# Patient Record
Sex: MALE | Race: ASIAN | NOT HISPANIC OR LATINO | URBAN - METROPOLITAN AREA
[De-identification: names, ages, dates, MRNs, and addresses within clinical notes are randomized per-mention and may not be internally consistent; named-entity substitution may affect disease eponyms.]

---

## 2017-01-04 ENCOUNTER — OFFICE VISIT (OUTPATIENT)
Dept: FAMILY MEDICINE | Facility: CLINIC | Age: 32
End: 2017-01-04

## 2017-01-04 VITALS
WEIGHT: 141.4 LBS | SYSTOLIC BLOOD PRESSURE: 113 MMHG | DIASTOLIC BLOOD PRESSURE: 73 MMHG | BODY MASS INDEX: 24.14 KG/M2 | TEMPERATURE: 97.8 F | HEART RATE: 67 BPM | HEIGHT: 64 IN

## 2017-01-04 DIAGNOSIS — G89.29 CHRONIC BILATERAL LOW BACK PAIN WITHOUT SCIATICA: ICD-10-CM

## 2017-01-04 DIAGNOSIS — M54.50 CHRONIC BILATERAL LOW BACK PAIN WITHOUT SCIATICA: ICD-10-CM

## 2017-01-04 DIAGNOSIS — R23.8 DRY SCALP: Primary | ICD-10-CM

## 2017-01-04 RX ORDER — IBUPROFEN 600 MG/1
600 TABLET, FILM COATED ORAL EVERY 6 HOURS PRN
Qty: 30 TABLET | Refills: 1 | Status: SHIPPED | OUTPATIENT
Start: 2017-01-04 | End: 2017-02-03

## 2017-01-04 NOTE — NURSING NOTE
name: Hermes House  Language: Kira  Agency: [facility]  Phone number: [phone]

## 2017-01-04 NOTE — PROGRESS NOTES
Preceptor attestation:  Patient seen and discussed with the resident.  Assessment and plan reviewed with resident and agreed upon.  Supervising physician: Anjum Harkins  [facility]

## 2017-01-04 NOTE — MR AVS SNAPSHOT
"              After Visit Summary   2017    Claudia Henson    MRN: [id number]           Patient Information     Date Of Birth          1985        Visit Information        Provider Department      2017 11:20 AM Jony Bobo MD [facility]        Today's Diagnoses     Dry scalp    -  1     Chronic bilateral low back pain without sciatica            Follow-ups after your visit        Who to contact     Please call your clinic at [phone] to:    Ask questions about your health    Make or cancel appointments    Discuss your medicines    Learn about your test results    Speak to your doctor   If you have compliments or concerns about an experience at your clinic, or if you wish to file a complaint, please contact [facility] Patient Relations at [phone] or email us at [email or web address]         Additional Information About Your Visit        MyChart Information     RoboteX is an electronic gateway that provides easy, online access to your medical records. With RoboteX, you can request a clinic appointment, read your test results, renew a prescription or communicate with your care team.     To sign up for [employer] visit the website at [email or web address]   You will be asked to enter the access code listed below, as well as some personal information. Please follow the directions to create your username and password.     Your access code is: [email or web address]  Expires: 2017 11:39 AM     Your access code will  in 90 days. If you need help or a new code, please contact your [facility] or call [phone] for assistance.        Care EveryWhere ID     This is your Care EveryWhere ID. This could be used by other organizations to access your [place] medical records  [id number]        Your Vitals Were     Pulse Temperature Height BMI (Body Mass Index)          67 97.8  F (36.6  C) (Oral) 5' 3.5\" (161.3 cm) 24.65 kg/m2   "       Blood Pressure from Last 3 Encounters:   01/04/17 113/73    Weight from Last 3 Encounters:   01/04/17 141 lb 6.4 oz (64.139 kg)              Today, you had the following     No orders found for display         Today's Medication Changes          These changes are accurate as of: 1/4/17 11:39 AM.  If you have any questions, ask your nurse or doctor.               Start taking these medicines.        Dose/Directions    ibuprofen 600 MG tablet   Commonly known as:  ADVIL/MOTRIN   Used for:  Chronic bilateral low back pain without sciatica   Started by:  Jony Bobo MD        Dose:  600 mg   Take 1 tablet (600 mg) by mouth every 6 hours as needed for moderate pain   Quantity:  30 tablet   Refills:  1       ketoconazole 1 % shampoo   Used for:  Dry scalp   Started by:  Jony Bobo MD        Please use this shampoo twice a week.   Quantity:  1 Bottle   Refills:  1            Where to get your medicines      These medications were sent to [facility] - [place], [place] - [address], [place] [address]     Phone:  [phone]    - ibuprofen 600 MG tablet  - ketoconazole 1 % shampoo             Primary Care Provider Office Phone # Fax #    Sharad Siddiqi [phone]       [address] [address]        Thank you!     Thank you for choosing [facility]  for your care. Our goal is always to provide you with excellent care. Hearing back from our patients is one way we can continue to improve our services. Please take a few minutes to complete the written survey that you may receive in the mail after your visit with us. Thank you!             Your Updated Medication List - Protect others around you: Learn how to safely use, store and throw away your medicines at www.disposemymeds.org.          This list is accurate as of: 1/4/17 11:39 AM.  Always use your most recent med list.                   Brand Name Dispense  Instructions for use    ibuprofen 600 MG tablet    ADVIL/MOTRIN    30 tablet    Take 1 tablet (600 mg) by mouth every 6 hours as needed for moderate pain       ketoconazole 1 % shampoo     1 Bottle    Please use this shampoo twice a week.

## 2017-01-05 NOTE — PROGRESS NOTES
"S: Claudia Henson is a 31 year old otherwise healthy male who presents to clinic for evaluation of scalp rash and low back pain.     He states that he has had this scalp rash on and off for the last year. He says it lasts for a week at a time and resolves on its own. He says at times he has draining lesions on his scalp. The rash is pruritic. He does not currently have the rash at this time. He does not having dandruff. He does not have any household members with similar symptoms. He has not tried any new hair care products recently. He washes his hair with shampoo every day. He says \"If I don't wash my hair every day my scalp gets very oily.\"     With regards to his back pain, he has been noting intermittent low back pain with heavy lifting for the past year. He says the pain is in the muscles next to his spine. He takes 1 tylenol for the pain when it occurs and it helps slightly. He lifts 40-50lbs at work at times. He does not note any radiation of pain, or any weakness or sensation loss in bilateral lower extremities.    ROS: Denies chest pain, sob, abdominal pain, or rashes in any other areas..     PMH: No known past medical history  MEDS: Not currently taking any other medications besides tylenol.  Allergies: NKDA    O:  /73 mmHg  Pulse 67  Temp(Src) 97.8  F (36.6  C) (Oral)  Ht 5' 3.5\" (161.3 cm)  Wt 141 lb 6.4 oz (64.139 kg)  BMI 24.65 kg/m2  General: Patient is pleasant to speak to, NAD, answers questions appropriately  Head: Areas of dried skin noted on scalp, no erythematous rashes, no areas of baldness. No draining lesions noted. Dandruff noted in hair. Atraumatic, normocephalic.  CV: S1, S2, RRR. No murmurs, clicks, or rubs  RESP: Clear to auscultation bilaterally  BACK: No scoliosis noted. No spinal step offs. No spinal tenderness to palpation. No paraspinal tenderness to palpation.     A/P: Claudia Henson is a 31 year old otherwise healthy male who presents to clinic for evaluation of scalp rash and low " back pain.    (R23.8) Dry scalp  (primary encounter diagnosis)  Comment: Patient's rash is likely secondary to dry scalp. Rash is intermittent and not causing any hair loss. It is not currently present, so it is hard to evaluate. Unlikely allergic dermatitis.   Plan:   -ketoconazole 1 % shampoo twice a week  -reduce shampooing hair to once every other day  - Patient should return to clinic if frequency of rash does not improve. He should come in when he has an active rash so it can be evaluated.             (M54.5,  G89.29) Chronic bilateral low back pain without sciatica  Comment: Low back pain likely secondary to overuse and low back strain. No signs of radicular pain. No concern for fracture at this time. Recommended PT, but patient does not wish to have PT at this time.   Plan:    -ibuprofen (ADVIL/MOTRIN) 600 MG tablet q6h PRN for pain      Jony Bobo MD  PGY1 [facility]

## 2017-02-03 DIAGNOSIS — M54.50 CHRONIC BILATERAL LOW BACK PAIN WITHOUT SCIATICA: Primary | ICD-10-CM

## 2017-02-03 DIAGNOSIS — G89.29 CHRONIC BILATERAL LOW BACK PAIN WITHOUT SCIATICA: Primary | ICD-10-CM

## 2017-02-05 RX ORDER — IBUPROFEN 600 MG/1
600 TABLET, FILM COATED ORAL EVERY 6 HOURS PRN
Qty: 30 TABLET | Refills: 1 | Status: SHIPPED
Start: 2017-02-05 | End: 2018-05-04

## 2017-03-08 DIAGNOSIS — R23.8 DRY SCALP: ICD-10-CM

## 2018-05-04 ENCOUNTER — OFFICE VISIT (OUTPATIENT)
Dept: FAMILY MEDICINE | Facility: CLINIC | Age: 33
End: 2018-05-04
Payer: COMMERCIAL

## 2018-05-04 VITALS
HEIGHT: 64 IN | SYSTOLIC BLOOD PRESSURE: 121 MMHG | OXYGEN SATURATION: 100 % | HEART RATE: 61 BPM | BODY MASS INDEX: 24.96 KG/M2 | WEIGHT: 146.2 LBS | TEMPERATURE: 98.1 F | DIASTOLIC BLOOD PRESSURE: 82 MMHG

## 2018-05-04 DIAGNOSIS — R23.8 DRY SCALP: ICD-10-CM

## 2018-05-04 DIAGNOSIS — L98.8 EROSIVE PUSTULAR DERMATOSIS OF SCALP: Primary | ICD-10-CM

## 2018-05-04 RX ORDER — TERBINAFINE HYDROCHLORIDE 250 MG/1
250 TABLET ORAL DAILY
Qty: 42 TABLET | Refills: 0 | Status: SHIPPED | OUTPATIENT
Start: 2018-05-04 | End: 2021-04-07

## 2018-05-04 NOTE — MR AVS SNAPSHOT
"              After Visit Summary   2018    Claudia Henson    MRN: [id number]           Patient Information     Date Of Birth          1985        Visit Information        Provider Department      2018 9:20 AM Anjum Harkins MD [facility]        Today's Diagnoses     Erosive pustular dermatosis of scalp    -  1    Dry scalp          Care Instructions    Ongoing scalp irritation. Redness and sensativity.  Some itch. Scattered White pustules form. Associated with hair \"coming out\" in these scattered areas.  Nizoral shampoo didn't help much    Could be fungal.  1) Nizoral shampoo twice a week  2) Rodrigue Baby shampoo on other days.   Limit washing/rubbing.  Pat dry.  No other creams  3) Terbinafine tablets    May need \"biopsy\" if not improved.    Recheck in 5 weeks          Follow-ups after your visit        Who to contact     Please call your clinic at [phone] to:    Ask questions about your health    Make or cancel appointments    Discuss your medicines    Learn about your test results    Speak to your doctor            Additional Information About Your Visit        MyChart Information     DirectLaw is an electronic gateway that provides easy, online access to your medical records. With DirectLaw, you can request a clinic appointment, read your test results, renew a prescription or communicate with your care team.     To sign up for DirectLaw visit the website at [email or web address]   You will be asked to enter the access code listed below, as well as some personal information. Please follow the directions to create your username and password.     Your access code is: [email or web address]  Expires: 2018 10:07 AM     Your access code will  in 90 days. If you need help or a new code, please contact your [facility] or call [phone] for assistance.        Care EveryWhere ID     This is your Care EveryWhere ID. This could be used by other organizations to access your " "[place] medical records  [id number]        Your Vitals Were     Pulse Temperature Height Pulse Oximetry BMI (Body Mass Index)       61 98.1  F (36.7  C) (Oral) 5' 3.5\" (161.3 cm) 100% 25.49 kg/m2        Blood Pressure from Last 3 Encounters:   05/04/18 121/82   01/04/17 113/73    Weight from Last 3 Encounters:   05/04/18 146 lb 3.2 oz (66.3 kg)   01/04/17 141 lb 6.4 oz (64.1 kg)              Today, you had the following     No orders found for display         Today's Medication Changes          These changes are accurate as of 5/4/18 10:07 AM.  If you have any questions, ask your nurse or doctor.               Start taking these medicines.        Dose/Directions    terbinafine 250 MG tablet   Commonly known as:  lamISIL   Used for:  Erosive pustular dermatosis of scalp   Started by:  Anjum Harkins MD        Dose:  250 mg   Take 1 tablet (250 mg) by mouth daily   Quantity:  42 tablet   Refills:  0         Stop taking these medicines if you haven't already. Please contact your care team if you have questions.     ibuprofen 600 MG tablet   Commonly known as:  ADVIL/MOTRIN   Stopped by:  Anjum Harkins MD                Where to get your medicines      These medications were sent to [facility] - [place], [place] - [address], [place] [address]     Phone:  [phone]     ketoconazole 1 % shampoo    terbinafine 250 MG tablet                Primary Care Provider Office Phone # Fax #    Jony Fredrick Bobo [phone]       [facility] [address] [place] [address]        Equal Access to Services     [facility] CampusRAGINI AH: Hadii emili feldman hadasho Sopito, waaxda luqadaha, qaybta kaalmada adeegyada, ashlee whitfield. So [facility] [phone].    ATENCIÓN: Si habla español, tiene a iraheta disposición servicios gratuitos de asistencia lingüística. Maria T malone [phone].    We comply with applicable federal civil rights laws and [place] laws. We " do not discriminate on the basis of race, color, national origin, age, disability, sex, sexual orientation, or gender identity.            Thank you!     Thank you for choosing [facility]  for your care. Our goal is always to provide you with excellent care. Hearing back from our patients is one way we can continue to improve our services. Please take a few minutes to complete the written survey that you may receive in the mail after your visit with us. Thank you!             Your Updated Medication List - Protect others around you: Learn how to safely use, store and throw away your medicines at www.disposemymeds.org.          This list is accurate as of 5/4/18 10:07 AM.  Always use your most recent med list.                   Brand Name Dispense Instructions for use Diagnosis    ketoconazole 1 % shampoo     1 Bottle    Please use this shampoo twice a week.    Dry scalp       terbinafine 250 MG tablet    lamISIL    42 tablet    Take 1 tablet (250 mg) by mouth daily    Erosive pustular dermatosis of scalp

## 2018-05-04 NOTE — NURSING NOTE
Due to patient being non-English speaking/uses sign language, an  was used for this visit. Only for face-to-face interpretation by an external agency, date and length of interpretation can be found on the scanned worksheet.     name: Dominick Worthy  Agency: [employer]  Language: Kira   Telephone number: [phone]  Type of interpretation: Face-to-face, spoken

## 2018-05-04 NOTE — PROGRESS NOTES
"       SUBJECTIVE       Claudia Henson is a 33 year old  male with a PMH significant for:   There is no problem list on file for this patient.    He presents with Ongoing scalp irritation. Redness and sensativity.  Some itch. Scattered White pustules form. Associated with hair \"coming out\" in these scattered areas.  Nizoral shampoo didn't help much.    PMH, Medications and Allergies were reviewed and updated as needed.        REVIEW OF SYSTEMS     General: No fevers, chills, sweats, unexplained weight loss  Head: No headache  Neck: No swallowing problems   CV: No chest pain or palpitations  Resp: No shortness of breath.  No cough. No hemoptysis.  GI: No constipation, diarrhea, or blood in stool.  no nausea or vomiting  : No pain passing urine or urinary frequency            OBJECTIVE     Vitals:    05/04/18 0925   BP: 121/82   Pulse: 61   Temp: 98.1  F (36.7  C)   TempSrc: Oral   SpO2: 100%   Weight: 146 lb 3.2 oz (66.3 kg)   Height: 5' 3.5\" (161.3 cm)     Body mass index is 25.49 kg/(m^2).    Gen:  Well nourished and in NAD  HEENT: PERRLA; TMs normal color and landmarks; nasopharynx pink and moist; oropharynx pink and moist  Neck: supple without lymphadenopathy  CV:  RRR  - no murmurs, rubs, or gallups,   Pulm:  CTAB, no wheezes/rales/rhonchi, good air entry   ABD: soft, nontender, no masses, no rebound, BS intact throughout  Scalp: Diffuse scattered erythematous papules. Some scale. No crust. No ring pattern. 8-10 one mm white pustules scattered. Associated hair \"thinning\". No broke shafts visiable     No results found for this or any previous visit (from the past 24 hour(s)).        ASSESSMENT AND PLAN     Claudia was seen today for other and medication reconciliation.    Diagnoses and all orders for this visit:    Erosive pustular dermatosis of scalp  -     terbinafine (LAMISIL) 250 MG tablet; Take 1 tablet (250 mg) by mouth daily    Dry scalp  -     ketoconazole 1 % shampoo; Please use this shampoo twice a " "week.        Patient Instructions   Ongoing scalp irritation. Redness and sensativity.  Some itch. Scattered White pustules form. Associated with hair \"coming out\" in these scattered areas.  Nizoral shampoo didn't help much    Could be fungal.  1) Nizoral shampoo twice a week  2) Rodrigue Baby shampoo on other days.   Limit washing/rubbing.  Pat dry.  No other creams  3) Terbinafine tablets    May need \"biopsy\" if not improved.    Recheck in 5 weeks      Total of 30 minutes was spent in face to face contact with patient with > 50% in counseling and coordination of care.  Options for treatment and/or follow-up care were reviewed with the patient. Claudia Sixto was engaged and actively involved in the decision making process. He verbalized understanding of the options discussed and was satisfied with the final plan.        Anjum Harkins MD        "

## 2018-05-04 NOTE — PATIENT INSTRUCTIONS
"Ongoing scalp irritation. Redness and sensativity.  Some itch. Scattered White pustules form. Associated with hair \"coming out\" in these scattered areas.  Nizoral shampoo didn't help much    Could be fungal.  1) Nizoral shampoo twice a week  2) Rodrigue Baby shampoo on other days.   Limit washing/rubbing.  Pat dry.  No other creams  3) Terbinafine tablets    May need \"biopsy\" if not improved.    Recheck in 5 weeks  "

## 2018-06-11 DIAGNOSIS — R23.8 DRY SCALP: ICD-10-CM

## 2019-06-03 ENCOUNTER — OFFICE VISIT (OUTPATIENT)
Dept: FAMILY MEDICINE | Facility: CLINIC | Age: 34
End: 2019-06-03
Payer: COMMERCIAL

## 2019-06-03 VITALS
WEIGHT: 144 LBS | HEART RATE: 83 BPM | SYSTOLIC BLOOD PRESSURE: 110 MMHG | TEMPERATURE: 98.4 F | OXYGEN SATURATION: 99 % | BODY MASS INDEX: 25.52 KG/M2 | HEIGHT: 63 IN | DIASTOLIC BLOOD PRESSURE: 76 MMHG | RESPIRATION RATE: 16 BRPM

## 2019-06-03 DIAGNOSIS — G89.29 CHRONIC BILATERAL LOW BACK PAIN WITHOUT SCIATICA: Primary | ICD-10-CM

## 2019-06-03 DIAGNOSIS — L30.9 ECZEMA, UNSPECIFIED TYPE: ICD-10-CM

## 2019-06-03 DIAGNOSIS — M54.50 CHRONIC BILATERAL LOW BACK PAIN WITHOUT SCIATICA: Primary | ICD-10-CM

## 2019-06-03 RX ORDER — ACETAMINOPHEN 500 MG
500-1000 TABLET ORAL EVERY 8 HOURS PRN
Qty: 60 TABLET | COMMUNITY
Start: 2019-06-03 | End: 2021-04-07

## 2019-06-03 RX ORDER — NAPROXEN 500 MG/1
500 TABLET ORAL 2 TIMES DAILY WITH MEALS
Qty: 60 TABLET | Refills: 3 | Status: SHIPPED | OUTPATIENT
Start: 2019-06-03 | End: 2021-04-07

## 2019-06-03 RX ORDER — TRIAMCINOLONE ACETONIDE 5 MG/G
CREAM TOPICAL 2 TIMES DAILY
Qty: 15 G | Refills: 3 | Status: SHIPPED | OUTPATIENT
Start: 2019-06-03 | End: 2020-01-09

## 2019-06-03 ASSESSMENT — MIFFLIN-ST. JEOR: SCORE: 1492.27

## 2019-06-03 NOTE — NURSING NOTE
Due to patient being non-English speaking/uses sign language, an  was used for this visit. Only for face-to-face interpretation by an external agency, date and length of interpretation can be found on the scanned worksheet.       name:  Dominick Worthy  Agency: [employer]  Language: Kira  Telephone number:   [phone]  Type of interpretation:  Face-to-face, Spoken

## 2019-06-03 NOTE — PROGRESS NOTES
"       JAMAL Henson is a 34 year old  male with a PMH significant for:   There is no problem list on file for this patient.    He is here today for back pain and a foot rash.     His back pain is intermittent, once every 0.5-3 weeks, for the last 6 years. He does not recognize any triggers. No relieving factors. When it hurts it is burning in quality located in the lower lumbar region. It is usually bilateral but sometimes unilateral. No history of kidney stones or infections. No numbness or tingling. No radiation of symptoms. No urinary or stool incontinence. He reports tight muscles that make it harder for him to bend forward. He has not been taking the tylenol or ibuprofen that was previously prescribed. He was previously recommended PT, however he declined at that time (2017).    Patient also reports a rash on the front of his right foot. He reports that he has been diagnosed with eczema in the past, he has used over-the-counter hydrocortisone cream in the past which has resolved the rash, but it has returned. This time hydrocortisone cream has not worked.     PMH, Medications and Allergies were reviewed and updated as needed.    ROS: no fever, chills, changes in vision, changes in bowel movements, changes in sensation, weakness        OBJECTIVE     Vitals:    06/03/19 1620   BP: 110/76   BP Location: Left arm   Patient Position: Sitting   Cuff Size: Adult Regular   Pulse: 83   Resp: 16   Temp: 98.4  F (36.9  C)   TempSrc: Oral   SpO2: 99%   Weight: 65.3 kg (144 lb)   Height: 1.607 m (5' 3.25\")     Body mass index is 25.31 kg/m .    General :  healthy and alert, no distress  HEENT:  PERRLA  Cardiovascular: regular rate and rhythm, normal S1/S2 no other heart sounds  Respiratory:  CTA, normal respiratory effort  Musculoskeletal: No back pain to palpation, no back pain with flexion of the back. Negative straight leg test  Skin:   Right foot with dry scaling plaque and thickened skin, evidence of " bleeding.   Neurological:  normal gait, sensation intact, LE strength intact    No results found for this or any previous visit (from the past 24 hour(s)).    ASSESSMENT AND PLAN       Claudia was seen today for back pain, other and medication reconciliation.    Diagnoses and all orders for this visit:    Chronic bilateral low back pain without sciatica: Intermittent, no red flag signs, likely musculoskeletal in origin.  Does not limit her function.  Patient reports that he is mostly worried that it is kidney pain, no personal or family history of kidney problems.  Patient reports that he has not been taking naproxen acetaminophen, will prescribe.  Patient was offered PT, however declined.  -     naproxen (NAPROSYN) 500 MG tablet; Take 1 tablet (500 mg) by mouth 2 times daily (with meals)  -     acetaminophen (TYLENOL) 500 MG tablet; Take 1-2 tablets (500-1,000 mg) by mouth every 8 hours as needed for mild pain    Eczema, unspecified type; chronic eczema, will treat with triamcinolone and hydration.  Recommended triamcinolone twice daily, and hydration 3 times daily.  Follow-up in 6 weeks.  -     triamcinolone (ARISTOCORT HP) 0.5 % external cream; Apply topically 2 times daily    RTC in 6 for follow up of eczema or sooner if develops new or worsening symptoms.    Discussed with MD Jony Simpson PGY 1  [facility]

## 2019-06-04 NOTE — PROGRESS NOTES
Preceptor Attestation:   Patient seen, evaluated and discussed with the resident. I have verified the content of the note, which accurately reflects my assessment of the patient and the plan of care.   Supervising Physician:  Jose L Espinoza MD

## 2020-01-09 ENCOUNTER — OFFICE VISIT (OUTPATIENT)
Dept: FAMILY MEDICINE | Facility: CLINIC | Age: 35
End: 2020-01-09
Payer: COMMERCIAL

## 2020-01-09 VITALS
HEIGHT: 64 IN | TEMPERATURE: 97.8 F | OXYGEN SATURATION: 98 % | RESPIRATION RATE: 18 BRPM | WEIGHT: 145 LBS | HEART RATE: 69 BPM | BODY MASS INDEX: 24.75 KG/M2 | SYSTOLIC BLOOD PRESSURE: 113 MMHG | DIASTOLIC BLOOD PRESSURE: 74 MMHG

## 2020-01-09 DIAGNOSIS — R23.8 DRY SCALP: ICD-10-CM

## 2020-01-09 DIAGNOSIS — L30.9 ECZEMA, UNSPECIFIED TYPE: ICD-10-CM

## 2020-01-09 RX ORDER — TRIAMCINOLONE ACETONIDE 5 MG/G
CREAM TOPICAL 2 TIMES DAILY
Qty: 15 G | Refills: 3 | Status: SHIPPED | OUTPATIENT
Start: 2020-01-09 | End: 2021-04-07

## 2020-01-09 ASSESSMENT — MIFFLIN-ST. JEOR: SCORE: 1500.78

## 2020-01-09 NOTE — PATIENT INSTRUCTIONS
Please follow up with a dentist with regards to the health of your teeth and their impact on your saliva production.     Use the steroid lotion for your rash twice daily    Use Keoconazole shampoo once daily for dry hair. Follow up as needed for possible treatment of hair loss.

## 2020-01-09 NOTE — PROGRESS NOTES
"       SUBJECTIVE       Claudia Henson is a 34 year old  male with a PMH significant for:   There is no problem list on file for this patient.    He presents with a rash on right ankle which has been present for over a year. He was treated with a cream in the past which helped partially. When he first noticed the rash it was an itchy plaque. He would scratch it and it started to bleed. He has never had another rash like this before and he has no other worrisome rashes or lesions. No allergies or asthma. No new laundry detergents, lotions, soaps.     The second issue for the pt is dandruff which is itchy. He feels his hair is growing in slower and thinner and worries this is due to the dandruff. He also notes that his hair when it is longer is oily and smells bad.  He washes every other day due to this issue. He does not use a consistent shampoo and rather just uses whatever is available. In the past he was prescribed ketoconazole which helped somewhat but he has not used for over a year.     His third issue is saliva overproduction at night which is causing him to drool on his pillow excessively. He notes that his saliva production causes him to choke if he lies on his back. His saliva is \"like spit\" sometimes composed of mucous. No heart burn. No coughing. No excess phlegm in the morning.    Pt smokes 4 cigarettes per day and has smoked since he was 15. He drinks on rare occasion, maybe twice a year. No beedle nut and no recreational drug use.     PMH, Medications and Allergies were reviewed and updated as needed.        REVIEW OF SYSTEMS     12 point review of systems negative except per HPI        OBJECTIVE     Vitals:    01/09/20 0937   BP: 113/74   BP Location: Right arm   Patient Position: Sitting   Cuff Size: Adult Regular   Pulse: 69   Resp: 18   Temp: 97.8  F (36.6  C)   TempSrc: Oral   SpO2: 98%   Weight: 65.8 kg (145 lb)   Height: 1.613 m (5' 3.5\")     Body mass index is 25.28 kg/m .  General: awake, alert, in " no apparent distress  Skin: warm dry  Head: atraumatic, hair thinning with slight scaling noted on scalp  Heent: Poor dentition noted, no erythematous or bleeding gums. Nares pink and moist   R Ankle lesion: keloid appearance with secondary excoriations, 3cm x 3cm in largest diameter, well demarcated borders without melanocytic appearance, scaling, or other concerning discoloration. Dermatitis appearance  Cardiovascular: RRR, no murmurs  Respiratory: CTA  Neuro: grossly normal  Psych: appropriate mood and affect    No results found for this or any previous visit (from the past 24 hour(s)).        ASSESSMENT AND PLAN     There are no diagnoses linked to this encounter.  Claudia was seen today for derm problem, other and hair/scalp problem.    Diagnoses and all orders for this visit:    Dry scalp  -     ketoconazole 1 % shampoo; Please use this shampoo twice a week.    Eczema, unspecified type  -     triamcinolone (ARISTOCORT HP) 0.5 % external cream; Apply topically 2 times daily    Excess saliva  Poor dentition         -     Dental referral     Consider starting minoxidil therapy at future visit.    RTC in 4 weeks for follow up of rash and hair loss or sooner if develops new or worsening symptoms.    Catracho Galarza MD

## 2020-01-09 NOTE — NURSING NOTE
Due to patient being non-English speaking/uses sign language, an  was used for this visit. Only for face-to-face interpretation by an external agency, date and length of interpretation can be found on the scanned worksheet.       name: Hermes House  Language: Kira  Agency:  [employer]  Telephone number: [phone]  Type of interpretation:  Face-to-face, spoken

## 2020-02-05 NOTE — PROGRESS NOTES
Preceptor Attestation:   Patient seen, evaluated and discussed with the resident. I have verified the content of the note, which accurately reflects my assessment of the patient and the plan of care.   Supervising Physician:  Jose L Espinoza MD.

## 2020-04-17 ENCOUNTER — TELEPHONE (OUTPATIENT)
Dept: FAMILY MEDICINE | Facility: CLINIC | Age: 35
End: 2020-04-17

## 2020-04-17 NOTE — LETTER
April 17, 2020      Claudia Sixto  [address]  [place] [address]        Dear Claudia,      We tried reaching you by phone but were unable to connect with you. We are reaching out to see how you are doing. This is a very stressful time in the world, which can cause an increase in personal stress and anxiety.     Our clinic is open.  We are here for you and are ready to meet all of your healthcare needs.  We have delayed preventive care until July.  We want everyone who can to stay home during this time for their health and the health of all.  We are now having most visits over the phone, but will see people in person if your doctor agrees that it is necessary.  We also will have video visits starting on April 6, 2020.      Call us with any questions or concerns you may have, and know that we are all in this together.       Sincerely,     Your team at [facility]  [phone]

## 2021-04-07 ENCOUNTER — OFFICE VISIT (OUTPATIENT)
Dept: FAMILY MEDICINE | Facility: CLINIC | Age: 36
End: 2021-04-07
Payer: COMMERCIAL

## 2021-04-07 VITALS
TEMPERATURE: 99.3 F | HEART RATE: 77 BPM | RESPIRATION RATE: 18 BRPM | OXYGEN SATURATION: 98 % | BODY MASS INDEX: 26.05 KG/M2 | DIASTOLIC BLOOD PRESSURE: 76 MMHG | WEIGHT: 147 LBS | HEIGHT: 63 IN | SYSTOLIC BLOOD PRESSURE: 114 MMHG

## 2021-04-07 DIAGNOSIS — L30.8 OTHER ECZEMA: ICD-10-CM

## 2021-04-07 DIAGNOSIS — R11.0 NAUSEA: ICD-10-CM

## 2021-04-07 DIAGNOSIS — R14.0 BLOATING SYMPTOM: ICD-10-CM

## 2021-04-07 DIAGNOSIS — K29.00 ACUTE GASTRITIS WITHOUT HEMORRHAGE, UNSPECIFIED GASTRITIS TYPE: Primary | ICD-10-CM

## 2021-04-07 PROCEDURE — 99213 OFFICE O/P EST LOW 20 MIN: CPT | Mod: GC | Performed by: STUDENT IN AN ORGANIZED HEALTH CARE EDUCATION/TRAINING PROGRAM

## 2021-04-07 RX ORDER — FAMOTIDINE 20 MG/1
20 TABLET, FILM COATED ORAL 2 TIMES DAILY
Qty: 30 TABLET | Refills: 3 | Status: SHIPPED | OUTPATIENT
Start: 2021-04-07 | End: 2021-04-16

## 2021-04-07 RX ORDER — TRIAMCINOLONE ACETONIDE 1 MG/G
OINTMENT TOPICAL 2 TIMES DAILY
Qty: 30 G | Refills: 1 | Status: SHIPPED | OUTPATIENT
Start: 2021-04-07 | End: 2023-02-01

## 2021-04-07 ASSESSMENT — MIFFLIN-ST. JEOR: SCORE: 1487.95

## 2021-04-07 NOTE — NURSING NOTE
name: Tiana Bingham  Language: Kira  Agency: [employer]  Phone number: [phone]    Due to patient being non-English speaking/uses sign language, an  was used for this visit. Only for face-to-face interpretation by an external agency, date and length of interpretation can be found on the scanned worksheet.     name: Tiana Bingham  Agency: [employer]  Language: Kira   Telephone number: [phone]  Type of interpretation: Telephone, spoken

## 2021-04-07 NOTE — PATIENT INSTRUCTIONS
- apply triamcinolone cream to your ankle rashes twice a day for two weeks  - start taking famotidine (Pepcid) once daily for your stomach symptoms  - please provide a stool sample and return to clinic to test for H. Pylori  - please follow up in 3 weeks

## 2021-04-07 NOTE — PROGRESS NOTES
Assessment & Plan     Acute gastritis without hemorrhage, unspecified gastritis type  Bloating symptom  Nausea  Patient reporting increased gas and bloating in the past week, new onset nausea, but no abdominal pain and benign abdominal exam. Symptoms concerning for gastritis vs IBS. Patient does smoke which increases risk of gastritis. Will test for H. Pylori today and trial antacid.   - H. Pylori Agn Fecal ([employer]); Future  - famotidine (PEPCID) 20 MG tablet; Take 1 tablet (20 mg) by mouth 2 times daily    Other eczema  Mild plaque psoriasis vs eczema. Will try stronger topical steroid BID for 2 weeks. Advised patient to moisturize well with aquaphor or similar between steroid applications.   - triamcinolone (KENALOG) 0.1 % external ointment; Apply topically 2 times daily    30 minutes spent on the date of the encounter doing chart review, history and exam, documentation and further activities per the note     Tobacco Cessation:   reports that he has been smoking. He has never used smokeless tobacco.  Tobacco Cessation Action Plan: Information offered: Patient not interested at this time    Return in about 3 weeks (around 4/28/2021) for Follow up.    Benita Behm, MD PGY1  [facility]idency  04/07/21    I precepted today with Dr. Espinoza.    Sindhu Taylor is a 36 year old who presents for the following health issues gas, bloating, rash.     HPI   Patient reports bloating in stomach for several years, but this past Friday (5 days ago) he started to feel tired and short of breath after eating due to abdominal bloating. He also reports new onset nausea after eating that feels better after vomiting. This happened twice over the weekend. He denies heartburn and abdominal pain. He reports he's been feeling more gassy, taking gasX which has helped some. No diarrhea or constipation, no hematemasis, fever or chills. He does not notice a different in symptoms if he has a meal that is higher in fat.  "Dairy makes him feel more bloated. Noone in his family is sick. He says he smokes cigarettes after every meal.     Patient also asks if he can get a different medication to treat the rashes he has had on his anterior ankles for the past 3 years. He has treated with triamcinolone 0.5% cream without any improvement in dryness or pruritis.     Review of Systems   Constitutional, HEENT, cardiovascular, pulmonary, GI, , musculoskeletal, neuro, skin, endocrine and psych systems are negative, except as otherwise noted.      Objective    /76 (BP Location: Left arm, Patient Position: Sitting, Cuff Size: Adult Regular)   Pulse 77   Temp 99.3  F (37.4  C) (Tympanic)   Resp 18   Ht 1.594 m (5' 2.75\")   Wt 66.7 kg (147 lb)   SpO2 98%   BMI 26.25 kg/m    Body mass index is 26.25 kg/m .  Physical Exam   Constitutional: Awake, alert, cooperative, no apparent distress, and appears stated age.  Eyes: Lids and lashes normal, pupils equal, round and reactive to light, extra ocular muscles intact, sclera clear, conjunctiva normal.  ENT: Normocephalic, without obvious abnormality, atraumatic, external ears without lesions,   Neck: Supple, symmetrical, trachea midline, skin normal.  Lungs: No increased work of breathing, good air exchange, clear to auscultation bilaterally, no crackles or wheezing.  Cardiovascular: Regular rate and rhythm, normal S1 and S2, no S3 or S4, and no murmur noted.  Abdomen: No scars, normal bowel sounds, soft, non-distended, non-tender, no masses palpated, no hepatosplenomegally.  Musculoskeletal: Full range of motion noted. Tone is normal.  Neurologic: Awake, alert, oriented to name, place and time.  Cranial nerves II-XII are grossly intact.  Motor is 5 out of 5 bilaterally. Sensory is intact. Gait is normal.  Neuropsychiatric: Normal affect, mood, orientation, memory and insight.  Skin: patches of dry skin with plagues and excoriations on both anterior ankles. erythema, pallor, petechia or " purpura.    ----- Service Performed and Documented by Resident or Fellow ------

## 2021-04-08 DIAGNOSIS — K29.00 ACUTE GASTRITIS WITHOUT HEMORRHAGE, UNSPECIFIED GASTRITIS TYPE: ICD-10-CM

## 2021-04-09 LAB — H PYLORI AG STL QL IA: POSITIVE

## 2021-04-09 NOTE — PROGRESS NOTES
Preceptor Attestation:    I discussed the patient with the resident and evaluated the patient in person. I have verified the content of the note, which accurately reflects my assessment of the patient and the plan of care.   Supervising Physician:  Jose L Espinoza MD.

## 2021-04-16 ENCOUNTER — OFFICE VISIT (OUTPATIENT)
Dept: FAMILY MEDICINE | Facility: CLINIC | Age: 36
End: 2021-04-16
Payer: COMMERCIAL

## 2021-04-16 VITALS
HEART RATE: 77 BPM | SYSTOLIC BLOOD PRESSURE: 112 MMHG | BODY MASS INDEX: 26 KG/M2 | RESPIRATION RATE: 20 BRPM | TEMPERATURE: 97.8 F | DIASTOLIC BLOOD PRESSURE: 74 MMHG | WEIGHT: 145.6 LBS | OXYGEN SATURATION: 99 %

## 2021-04-16 DIAGNOSIS — L30.8 OTHER ECZEMA: ICD-10-CM

## 2021-04-16 DIAGNOSIS — A04.8 H. PYLORI INFECTION: Primary | ICD-10-CM

## 2021-04-16 DIAGNOSIS — R14.0 BLOATING SYMPTOM: ICD-10-CM

## 2021-04-16 DIAGNOSIS — R12 HEARTBURN: ICD-10-CM

## 2021-04-16 PROCEDURE — 99214 OFFICE O/P EST MOD 30 MIN: CPT | Mod: GC | Performed by: STUDENT IN AN ORGANIZED HEALTH CARE EDUCATION/TRAINING PROGRAM

## 2021-04-16 RX ORDER — CLARITHROMYCIN 500 MG
500 TABLET ORAL 2 TIMES DAILY
Qty: 28 TABLET | Refills: 0 | Status: SHIPPED | OUTPATIENT
Start: 2021-04-16 | End: 2021-04-21

## 2021-04-16 RX ORDER — AMOXICILLIN 500 MG/1
1000 CAPSULE ORAL 2 TIMES DAILY
Qty: 56 CAPSULE | Refills: 0 | Status: SHIPPED | OUTPATIENT
Start: 2021-04-16 | End: 2021-04-21 | Stop reason: ALTCHOICE

## 2021-04-16 NOTE — PATIENT INSTRUCTIONS
For your stomach infection:  - To treat H. Pylor, the infection in your stomach, I will start you on three medications.   - please take one tab of omeprazole and clarithromycin with two tabs of amoxicillin TWICE A DAY. Take these together for 14 days.   - in 6 weeks (4 weeks after you complete the medications), please come back to the clinic for another stool sample to make sure the infection is completely gone    For your rash on ankles:  - you may continue using triamcinolone ointment twice daily for 2 weeks. Then stop for at least 1 week. If needed, you may use it again for two weeks at a time.

## 2021-04-16 NOTE — PROGRESS NOTES
Preceptor Attestation:   Patient seen, evaluated and discussed with the resident. I have verified the content of the note, which accurately reflects my assessment of the patient and the plan of care.   Supervising Physician:  Olman Pascal MD

## 2021-04-16 NOTE — NURSING NOTE
Due to patient being non-English speaking/uses sign language, an  was used for this visit. Only for face-to-face interpretation by an external agency, date and length of interpretation can be found on the scanned worksheet.     name: BRITT SOW   Agency: [employer]  Language: Kira   Telephone number: [phone]  Type of interpretation: Telephone, spoken

## 2021-04-16 NOTE — PROGRESS NOTES
"Assessment & Plan     H. pylori infection  Bloating symptom  Heartburn  Patient instructed to discontinue ranitidine prescribed last visit. He has no recent antibiotic use, NKDA. We will start triple therapy today. Instructed patient he will need to take 4 tabs total twice daily for 14 days. He is understanding of proper use of triple therapy. Instructed to follow up in 6 weeks for test of cure.   - omeprazole (PRILOSEC) 20 MG DR capsule; Take 1 capsule (20 mg) by mouth 2 times daily for 14 days  - clarithromycin (BIAXIN) 500 MG tablet; Take 1 tablet (500 mg) by mouth 2 times daily for 14 days  - amoxicillin (AMOXIL) 500 MG capsule; Take 2 capsules (1,000 mg) by mouth 2 times daily for 14 days    Other eczema  Reviewed proper use of triamcinolone ointment today. Offered to change therapy or refer to dermatology in the future if needed.     BMI:   Estimated body mass index is 26 kg/m  as calculated from the following:    Height as of 4/7/21: 1.594 m (5' 2.75\").    Weight as of this encounter: 66 kg (145 lb 9.6 oz).   Weight management plan: Discussed healthy diet and exercise guidelines    See Patient Instructions    Return in about 6 weeks (around 5/28/2021) for Follow up.    Benita Behm, MD PGY1  [facility] Family Medicine Residency  04/16/21    I precepted today with Dr. Pascal.    Sindhu Taylor is a 36 year old who presents for the following health issues: H. Pylori infection.     HPI   Started on famotidine 20 mg BID last visit, but has only been taking once daily. He has not noticed a difference. He notes his symptoms of bloating, heartburn have gotten slightly worse since last visit, most notably heartburn when he lays down. Symptoms worse 30 minutes after eating.     Rash slightly improved on his ankles after starting stronger triamcinolone ointment. Itching has improved.     Review of Systems   Constitutional, HEENT, cardiovascular, pulmonary, gi and gu systems are negative, except as otherwise noted.    "   Objective    /74   Pulse 77   Temp 97.8  F (36.6  C) (Oral)   Resp 20   Wt 66 kg (145 lb 9.6 oz)   SpO2 99%   BMI 26.00 kg/m    Body mass index is 26 kg/m .  Physical Exam   Constitutional: Awake, alert, cooperative, no apparent distress, and appears stated age.  Eyes: Lids and lashes normal, pupils equal, round and reactive to light, extra ocular muscles intact, sclera clear, conjunctiva normal.  ENT: Normocephalic, without obvious abnormality, atraumatic, external ears without lesions,   Neck: Supple, symmetrical, trachea midline, skin normal.  Lungs: No increased work of breathing, good air exchange, clear to auscultation bilaterally, no crackles or wheezing.  Cardiovascular: Regular rate and rhythm, normal S1 and S2, no S3 or S4, and no murmur noted.  Abdomen: Normal bowel sounds, soft, non-distended, non-tender, no masses palpated, no hepatosplenomegally.  Musculoskeletal: Full range of motion noted. Tone is normal.  Neurologic: Awake, alert, oriented to name, place and time.  Cranial nerves II-XII are grossly intact. Sensory is intact. Gait is normal.  Neuropsychiatric: Normal affect, mood, orientation, memory and insight.  Skin: patches of dry skin with plagues and excoriations on both anterior ankles. erythema, pallor, petechia or purpura.    Orders Only on 04/08/2021   Component Date Value Ref Range Status     H Pylori Antigen 04/08/2021 Positive* NEG Final    Comment: Positive for Helicobacter pylori antigen by enzyme immunoassay. A positive  result indicates the presence of H. pylori antigen.  Performed and/or entered by:  [address] [address]            ----- Service Performed and Documented by Resident or Fellow ------

## 2021-04-21 ENCOUNTER — OFFICE VISIT (OUTPATIENT)
Dept: FAMILY MEDICINE | Facility: CLINIC | Age: 36
End: 2021-04-21
Payer: COMMERCIAL

## 2021-04-21 VITALS
BODY MASS INDEX: 25.93 KG/M2 | WEIGHT: 145.2 LBS | TEMPERATURE: 98.3 F | RESPIRATION RATE: 20 BRPM | OXYGEN SATURATION: 97 % | HEART RATE: 80 BPM | SYSTOLIC BLOOD PRESSURE: 124 MMHG | DIASTOLIC BLOOD PRESSURE: 74 MMHG

## 2021-04-21 DIAGNOSIS — T50.905A MEDICATION SIDE EFFECTS, INITIAL ENCOUNTER: ICD-10-CM

## 2021-04-21 DIAGNOSIS — R14.0 BLOATING SYMPTOM: ICD-10-CM

## 2021-04-21 DIAGNOSIS — R12 HEARTBURN: ICD-10-CM

## 2021-04-21 DIAGNOSIS — A04.8 H. PYLORI INFECTION: Primary | ICD-10-CM

## 2021-04-21 PROCEDURE — 99214 OFFICE O/P EST MOD 30 MIN: CPT | Mod: GC | Performed by: STUDENT IN AN ORGANIZED HEALTH CARE EDUCATION/TRAINING PROGRAM

## 2021-04-21 RX ORDER — CLARITHROMYCIN 500 MG
500 TABLET ORAL 2 TIMES DAILY
Qty: 28 TABLET | Refills: 0 | Status: SHIPPED | OUTPATIENT
Start: 2021-04-21 | End: 2021-11-16

## 2021-04-21 RX ORDER — METRONIDAZOLE 500 MG/1
500 TABLET ORAL 3 TIMES DAILY
Qty: 42 TABLET | Refills: 0 | Status: SHIPPED | OUTPATIENT
Start: 2021-04-21 | End: 2021-11-16

## 2021-04-21 NOTE — NURSING NOTE
Due to patient being non-English speaking/uses sign language, an  was used for this visit. Only for face-to-face interpretation by an external agency, date and length of interpretation can be found on the scanned worksheet.     name: Flash Vidales  Agency: [employer]  Language: Kira   Telephone number: [phone]  Type of interpretation: Telephone, spoken

## 2021-04-21 NOTE — PROGRESS NOTES
"    Assessment & Plan     H. pylori infection  Medication side effects, initial encounter  Heartburn  Bloating symptom  Patient returns to clinic today to discuss medication side effects from H pylori triple therapy. Side effects he reports are not typical side effects from omeprazole, amoxicillin, or clarithromycin. Discussed he is on the lowest dose of each medication to successfully treat H pylori. He was offered to swap amoxicillin with metronidazole and agreed to that today. He and his daughter present were given a detailed medication schedule on when to take each medication, please see AVS for more details. New scripts were sent to his pharmacy and he was instructed to toss the other medications he had started and to restart the 14 day process. He was provided a letter to excuse him from work.   - clarithromycin (BIAXIN) 500 MG tablet; Take 1 tablet (500 mg) by mouth 2 times daily  - omeprazole (PRILOSEC) 20 MG DR capsule; Take 1 capsule (20 mg) by mouth 2 times daily  - metroNIDAZOLE (FLAGYL) 500 MG tablet; Take 1 tablet (500 mg) by mouth 3 times daily    Return in about 6 weeks (around 6/2/2021) for Follow up. for test of cure, sooner if needed.     Benita Behm, MD PGY1  [facility]  04/22/21    I precepted today with Dr. Harkins.    Sindhu Taylor is a 36 year old who presents for the following health issues: medication side effects.     HPI   Patient reports medications prescribed for H pylori are too strong for him. He says it makes his stomach feel better, but says it is too strong for his body. He says they make his body feel numb and after he takes them \"I feel all the liquid coming up in my throat.\" He stopped taking the medications shortly after starting.     He is requesting a letter for time off from work as he has not been able to sleep well from heartburn he experiences when he lays down. He has felt so exhausted that he has had to call in sick to work twice.     Review of " Systems   Constitutional, HEENT, cardiovascular, pulmonary, gi and gu systems are negative, except as otherwise noted.      Objective    /74   Pulse 80   Temp 98.3  F (36.8  C) (Oral)   Resp 20   Wt 65.9 kg (145 lb 3.2 oz)   SpO2 97%   BMI 25.93 kg/m    Body mass index is 25.93 kg/m .  Physical Exam   Constitutional: Awake, alert, cooperative, no apparent distress, and appears stated age.  Eyes: Lids and lashes normal, pupils equal, round and reactive to light, extra ocular muscles intact, sclera clear, conjunctiva normal.  ENT: Normocephalic, without obvious abnormality, atraumatic, external ears without lesions,   Neck: Supple, symmetrical, trachea midline, skin normal.  Lungs: No increased work of breathing, good air exchange, clear to auscultation bilaterally, no crackles or wheezing.  Cardiovascular: Regular rate and rhythm, normal S1 and S2, no S3 or S4, and no murmur noted.  Musculoskeletal: Full range of motion noted. Tone is normal.  Neurologic: Awake, alert, oriented to name, place and time.  Cranial nerves II-XII are grossly intact. Sensory is intact. Gait is normal.  Neuropsychiatric: Normal affect, mood, orientation, memory and insight.  Skin: No visible erythema, pallor, petechia or purpura.    ----- Service Performed and Documented by Resident or Fellow ------

## 2021-04-21 NOTE — PATIENT INSTRUCTIONS
- please go to your pharmacy and  all of your new medications, then toss the ones you were given previously.   - I will write you a letter for work to be excused for 1 week  - Please follow up with me as needed in the next 1-2 weeks  - I will for sure need to see you again 4 weeks AFTER you complete your therapy, so in 6 weeks from today      *Please complete the following treatment schedule for 14 days:*    30 minutes BEFORE breakfast:  Take 1 tab omeprazole (Prilosec)  AFTER breakfast:  Take 1 tab of metronidazole (Flagyl) and 1 tab of clarithromycin  AFTER lunch: Take 1 tab of metronidazole (Flagyl)  30 minutes BEFORE evening meal:  Take 1 tab of omeprazole (Prilosec)  AFTER evening meal:  Take 1 tab of metronidazole (Flagyl) and 1 tab of clarithromycin

## 2021-04-21 NOTE — LETTER
[facility] [address] [place] [address]  Phone: [phone]  Fax: [phone]    April 21, 2021        Claudia Henson  [address] [place] [address]          To whom it may concern:    RE: Claudia Henson    Patient was seen and treated today at our clinic and missed work.  Patient may return to work 4/29/21 with the following:  No working or lifting restrictions    Please contact me for questions or concerns.      Sincerely,        Benita Kay Behm, MD

## 2021-04-23 NOTE — PROGRESS NOTES
Preceptor Attestation:    I discussed the patient with the resident and evaluated the patient in person. I have verified the content of the note, which accurately reflects my assessment of the patient and the plan of care.   Supervising Physician:  Anjum Harkins MD.

## 2021-10-15 ENCOUNTER — OFFICE VISIT (OUTPATIENT)
Dept: FAMILY MEDICINE | Facility: CLINIC | Age: 36
End: 2021-10-15
Payer: COMMERCIAL

## 2021-10-15 VITALS
SYSTOLIC BLOOD PRESSURE: 114 MMHG | WEIGHT: 140 LBS | OXYGEN SATURATION: 99 % | DIASTOLIC BLOOD PRESSURE: 73 MMHG | RESPIRATION RATE: 20 BRPM | HEART RATE: 68 BPM | BODY MASS INDEX: 25 KG/M2 | TEMPERATURE: 98.4 F

## 2021-10-15 DIAGNOSIS — K26.9 DUODENAL ULCER DUE TO HELICOBACTER PYLORI: Primary | ICD-10-CM

## 2021-10-15 DIAGNOSIS — B96.81 DUODENAL ULCER DUE TO HELICOBACTER PYLORI: Primary | ICD-10-CM

## 2021-10-15 PROCEDURE — 99214 OFFICE O/P EST MOD 30 MIN: CPT | Performed by: FAMILY MEDICINE

## 2021-10-15 RX ORDER — OMEPRAZOLE 40 MG/1
40 CAPSULE, DELAYED RELEASE ORAL 2 TIMES DAILY
Qty: 20 CAPSULE | Refills: 0 | Status: SHIPPED | OUTPATIENT
Start: 2021-10-15 | End: 2021-10-25

## 2021-10-15 RX ORDER — AMOXICILLIN 500 MG/1
1000 CAPSULE ORAL 2 TIMES DAILY
Qty: 40 CAPSULE | Refills: 0 | Status: SHIPPED | OUTPATIENT
Start: 2021-10-15 | End: 2021-10-25

## 2021-10-15 RX ORDER — LEVOFLOXACIN 500 MG/1
500 TABLET, FILM COATED ORAL DAILY
Qty: 10 TABLET | Refills: 0 | Status: SHIPPED | OUTPATIENT
Start: 2021-10-15 | End: 2021-10-25

## 2021-10-15 NOTE — NURSING NOTE
Due to patient being non-English speaking/uses sign language, an  was used for this visit. Only for face-to-face interpretation by an external agency, date and length of interpretation can be found on the scanned worksheet.     name: Gloria [id number]  Agency: [employer]  Language: Kira   Telephone number: [phone]  Type of interpretation: Telephone, spoken

## 2021-10-15 NOTE — PROGRESS NOTES
"Assessment & Plan     Duodenal ulcer due to Helicobacter pylori  Patient has symptoms consistent with a gastric or  duodenal ulcer.  His H. pylori stool test was positive.  This diagnosis was made in April.  He was prescribed triple therapy and stopped it after 2 days.  At this point we will try an alternate triple therapy using different medications the ones he did not complete in April.  I stressed to him the importance of taking the medications as directed for the full 10 days.  I pointed out that he could develop resistance infection that is difficult to treat if he does not complete his course of therapy.    We will have him follow-up in a month and at that point if he still symptomatic we would refer to GI for possible endoscopy.  If he is better, will still want to do a stool antigen for test of cure 1 month after completing the initial therapy.  - levofloxacin (LEVAQUIN) 500 MG tablet; Take 1 tablet (500 mg) by mouth daily for 10 days  - amoxicillin (AMOXIL) 500 MG capsule; Take 2 capsules (1,000 mg) by mouth 2 times daily for 10 days  - omeprazole (PRILOSEC) 40 MG DR capsule; Take 1 capsule (40 mg) by mouth 2 times daily for 10 days    Diagnosis or treatment significantly limited by social determinants of health - Language barrier and low health literacy.  30 minutes spent on the date of the encounter doing chart review, patient visit, documentation and Explaining the course of therapy, showing images on the computer of ulcers related to age pylori and printing off patient instructions on the proper use of his medications.        BMI:   Estimated body mass index is 25 kg/m  as calculated from the following:    Height as of 4/7/21: 1.594 m (5' 2.75\").    Weight as of this encounter: 63.5 kg (140 lb).     Enrique Antunez MD  [facility]    Options for treatment and/or follow-up care were reviewed with the patient. Claudia Henson was engaged and actively involved in the decision making " process. He verbalized understanding of the options discussed and was satisfied with the final plan.    RTC in 4 weeks for follow up or sooner if develops new or worsening symptoms.    Subjective: Claudia Henson is a 36 year old who was previously seen for H pylori.  He stopped taking his triple therapy after 2-3 days because it was making him sick.    He now reports having fatigue. Vomiting and chills when he eats.  He states that he was feeling fine until 2-3 weeks ago; specifically, although he did not take his triple therapy his stomach symptoms seemed to get better for several months and now returned.  He does not have food get stuck.  When he eats his chest and stomach feel full and tight and he feels like he needs to throw up.  He throws up almost every time he eats.  He has less symptoms if he eats porridge.  He has no sick contacts.  PMHX/PSHX/MEDS/ALLERGIES/SHX/FHX reviewed and updated in Epic.   ROS:   General: No fevers, chills   Head: No headache   CV: No chest pain or palpitations.   Resp: No shortness of breath. No cough. No hemoptysis.   GI: No nausea or vomiting.  No constipation or diarrhea.  Objective: /73   Pulse 68   Temp 98.4  F (36.9  C) (Oral)   Resp 20   Wt 63.5 kg (140 lb)   SpO2 99%   BMI 25.00 kg/m     Gen: Well nourished and in NAD   HEENT: TMs normal color and landmarks, nasopharynx pink and moist, oropharynx pink and moist  CV: RRR - no murmurs, rubs, or gallups  Pulm: Clear to auscultation without wheezing or crackles  ABD: soft, nontender, BS intact  Extrem: no cyanosis, edema or clubbing   Psych: Euthymic    Levofloxacin 500 mg once daily, in combination with amoxicillin 1 g twice daily, plus a standard-dose proton pump inhibitor twice daily

## 2021-11-16 ENCOUNTER — OFFICE VISIT (OUTPATIENT)
Dept: FAMILY MEDICINE | Facility: CLINIC | Age: 36
End: 2021-11-16
Payer: COMMERCIAL

## 2021-11-16 VITALS
DIASTOLIC BLOOD PRESSURE: 64 MMHG | BODY MASS INDEX: 24.59 KG/M2 | RESPIRATION RATE: 16 BRPM | HEART RATE: 73 BPM | HEIGHT: 63 IN | SYSTOLIC BLOOD PRESSURE: 95 MMHG | OXYGEN SATURATION: 96 % | TEMPERATURE: 98.8 F | WEIGHT: 138.8 LBS

## 2021-11-16 DIAGNOSIS — B96.81 DUODENAL ULCER DUE TO HELICOBACTER PYLORI: Primary | ICD-10-CM

## 2021-11-16 DIAGNOSIS — R53.83 FATIGUE, UNSPECIFIED TYPE: ICD-10-CM

## 2021-11-16 DIAGNOSIS — K26.9 DUODENAL ULCER DUE TO HELICOBACTER PYLORI: Primary | ICD-10-CM

## 2021-11-16 LAB — HGB BLD-MCNC: 16.2 G/DL (ref 13.3–17.7)

## 2021-11-16 PROCEDURE — 99214 OFFICE O/P EST MOD 30 MIN: CPT | Mod: GC | Performed by: STUDENT IN AN ORGANIZED HEALTH CARE EDUCATION/TRAINING PROGRAM

## 2021-11-16 PROCEDURE — 85018 HEMOGLOBIN: CPT | Performed by: STUDENT IN AN ORGANIZED HEALTH CARE EDUCATION/TRAINING PROGRAM

## 2021-11-16 PROCEDURE — 36415 COLL VENOUS BLD VENIPUNCTURE: CPT | Performed by: STUDENT IN AN ORGANIZED HEALTH CARE EDUCATION/TRAINING PROGRAM

## 2021-11-16 ASSESSMENT — MIFFLIN-ST. JEOR: SCORE: 1456.46

## 2021-11-16 NOTE — NURSING NOTE
Due to patient being non-English speaking/uses sign language, an  was used for this visit. Only for face-to-face interpretation by an external agency, date and length of interpretation can be found on the scanned worksheet.     name: [id number]  Agency: [employer]  Language: Kira   Telephone number: [phone]  Type of interpretation: Telephone, spoken

## 2021-11-16 NOTE — NURSING NOTE
Due to patient being non-English speaking/uses sign language, an  was used for this visit. Only for face-to-face interpretation by an external agency, date and length of interpretation can be found on the scanned worksheet.     name: Flash Vidales   Agency: [employer]  Language: Kira   Telephone number:   Type of interpretation: Face-to-face, spoken

## 2021-11-16 NOTE — PROGRESS NOTES
Preceptor Attestation:    I discussed the patient with the resident and evaluated the patient in person. I have verified the content of the note, which accurately reflects my assessment of the patient and the plan of care.   Supervising Physician:  Brant Almaraz MD.

## 2021-11-16 NOTE — PROGRESS NOTES
Assessment & Plan     1. Duodenal ulcer due to Helicobacter pylori  Patient presents for follow-up of H. pylori.  Initially diagnosed in April but did not complete treatment.  He started alternate triple therapy treatment on 10/15 and finished treatment of 10/25.   Recommend that he have test for medication done at least 4 weeks after finishing treatment so will return stool sample on 11/25 or 11/26.  Most of his symptoms have improved, but if his symptoms continue to bother him could consider further evaluation for gastroparesis, cholelithiasis, or other causes in the future.  - Occult blood stool; Future  - Helicobacter pylori Antigen Stool; Future  - Helicobacter pylori Antigen Stool    2. Fatigue, unspecified type  In the setting of suspected duodenal ulcer and fatigue, check hemoglobin and fecal occult stool today.  Hemoglobin within normal range. FOB pending.   - Hemoglobin      Ordering of each unique test  25 minutes spent on the date of the encounter doing chart review, history and exam, documentation and further activities per the note    Return if symptoms worsen or fail to improve.    Susan Culp MD PGY3  [facility] ITALIA Taylor is a 36 year old who presents for the following health issues:    HPI   He presents of follow-up of H Pylori. He has a history of H Pylori and suspected duodenal ulcer. Was prescribed triple therapy in April when he was initially diagnosed but didn't take more than 3 days of medication. Then in October saw Supa Antunez for similar symptoms (fullness, bloating, stomach pain, vomiting, fatigue, chills).  Started on alternate triple therapy d/t concern for resistance (levofloxacin 500 mg daily for 10 days, Amoxicillin 1000 mg BID x 10 days, and Omperazole 40 mg BID x 10 days).     He states that his symptoms are now improved, but still feels tired. When he eats oily foods, he still throws up but only once since fishing treatment. He reports full  "adherence to the 10 days of treatment and had no leftover pills.  He also notes that he has a bowel movement 1-2 times daily and it has been very dark- almost black recently.     Review of Systems   Constitutional, HEENT, cardiovascular, pulmonary, gi and gu systems are negative, except as otherwise noted.      Objective    BP 95/64 (BP Location: Left arm, Patient Position: Sitting, Cuff Size: Adult Regular)   Pulse 73   Temp 98.8  F (37.1  C) (Oral)   Resp 16   Ht 1.603 m (5' 3.11\")   Wt 63 kg (138 lb 12.8 oz)   SpO2 96%   BMI 24.50 kg/m    Body mass index is 24.5 kg/m .  Physical Exam   GENERAL: healthy, alert and no distress  RESP: lungs clear to auscultation - no rales, rhonchi or wheezes  CV: regular rate and rhythm, normal S1 S2, no S3 or S4, no murmur, click or rub, no peripheral edema and peripheral pulses strong  ABDOMEN: soft, nontender, no hepatosplenomegaly, no masses and bowel sounds normal  MS: no gross musculoskeletal defects noted, no edema    Results for orders placed or performed in visit on 11/16/21 (from the past 24 hour(s))   Hemoglobin   Result Value Ref Range    Hemoglobin 16.2 13.3 - 17.7 g/dL           "

## 2021-11-26 PROCEDURE — 87338 HPYLORI STOOL AG IA: CPT | Performed by: STUDENT IN AN ORGANIZED HEALTH CARE EDUCATION/TRAINING PROGRAM

## 2021-11-29 LAB — H PYLORI AG STL QL IA: NEGATIVE

## 2021-12-01 ENCOUNTER — LAB (OUTPATIENT)
Dept: LAB | Facility: CLINIC | Age: 36
End: 2021-12-01
Payer: COMMERCIAL

## 2021-12-01 DIAGNOSIS — B96.81 DUODENAL ULCER DUE TO HELICOBACTER PYLORI: ICD-10-CM

## 2021-12-01 DIAGNOSIS — K26.9 DUODENAL ULCER DUE TO HELICOBACTER PYLORI: ICD-10-CM

## 2021-12-01 PROCEDURE — 82274 ASSAY TEST FOR BLOOD FECAL: CPT

## 2021-12-02 DIAGNOSIS — K26.9 DUODENAL ULCER DUE TO HELICOBACTER PYLORI: Primary | ICD-10-CM

## 2021-12-02 DIAGNOSIS — B96.81 DUODENAL ULCER DUE TO HELICOBACTER PYLORI: Primary | ICD-10-CM

## 2021-12-02 LAB — HEMOCCULT STL QL IA: NEGATIVE

## 2021-12-28 ENCOUNTER — OFFICE VISIT (OUTPATIENT)
Dept: FAMILY MEDICINE | Facility: CLINIC | Age: 36
End: 2021-12-28
Payer: COMMERCIAL

## 2021-12-28 VITALS
HEIGHT: 63 IN | RESPIRATION RATE: 16 BRPM | OXYGEN SATURATION: 95 % | TEMPERATURE: 97.9 F | DIASTOLIC BLOOD PRESSURE: 80 MMHG | HEART RATE: 68 BPM | WEIGHT: 135.7 LBS | SYSTOLIC BLOOD PRESSURE: 121 MMHG | BODY MASS INDEX: 24.04 KG/M2

## 2021-12-28 DIAGNOSIS — Z23 NEED FOR PROPHYLACTIC VACCINATION AND INOCULATION AGAINST INFLUENZA: ICD-10-CM

## 2021-12-28 DIAGNOSIS — G47.30 SLEEP APNEA, UNSPECIFIED TYPE: ICD-10-CM

## 2021-12-28 DIAGNOSIS — Z23 HIGH PRIORITY FOR 2019-NCOV VACCINE: ICD-10-CM

## 2021-12-28 DIAGNOSIS — G47.00 INSOMNIA, UNSPECIFIED TYPE: Primary | ICD-10-CM

## 2021-12-28 DIAGNOSIS — Z23 NEED FOR VACCINATION: ICD-10-CM

## 2021-12-28 PROCEDURE — 0011A COVID-19,PF,MODERNA (18+ YRS PRIMARY SERIES .5ML): CPT | Performed by: STUDENT IN AN ORGANIZED HEALTH CARE EDUCATION/TRAINING PROGRAM

## 2021-12-28 PROCEDURE — 91301 COVID-19,PF,MODERNA (18+ YRS PRIMARY SERIES .5ML): CPT | Performed by: STUDENT IN AN ORGANIZED HEALTH CARE EDUCATION/TRAINING PROGRAM

## 2021-12-28 PROCEDURE — 90732 PPSV23 VACC 2 YRS+ SUBQ/IM: CPT | Performed by: STUDENT IN AN ORGANIZED HEALTH CARE EDUCATION/TRAINING PROGRAM

## 2021-12-28 PROCEDURE — 90471 IMMUNIZATION ADMIN: CPT | Performed by: STUDENT IN AN ORGANIZED HEALTH CARE EDUCATION/TRAINING PROGRAM

## 2021-12-28 PROCEDURE — 99214 OFFICE O/P EST MOD 30 MIN: CPT | Mod: 25 | Performed by: STUDENT IN AN ORGANIZED HEALTH CARE EDUCATION/TRAINING PROGRAM

## 2021-12-28 PROCEDURE — 90472 IMMUNIZATION ADMIN EACH ADD: CPT | Performed by: STUDENT IN AN ORGANIZED HEALTH CARE EDUCATION/TRAINING PROGRAM

## 2021-12-28 PROCEDURE — 90686 IIV4 VACC NO PRSV 0.5 ML IM: CPT | Performed by: STUDENT IN AN ORGANIZED HEALTH CARE EDUCATION/TRAINING PROGRAM

## 2021-12-28 RX ORDER — LANOLIN ALCOHOL/MO/W.PET/CERES
3 CREAM (GRAM) TOPICAL
Qty: 90 TABLET | Refills: 3 | Status: SHIPPED | OUTPATIENT
Start: 2021-12-28 | End: 2022-01-27

## 2021-12-28 RX ORDER — HYDROXYZINE PAMOATE 25 MG/1
25 CAPSULE ORAL DAILY PRN
Qty: 30 CAPSULE | Refills: 1 | Status: SHIPPED | OUTPATIENT
Start: 2021-12-28 | End: 2022-01-25

## 2021-12-28 RX ORDER — ACETAMINOPHEN 500 MG
500-1000 TABLET ORAL EVERY 6 HOURS PRN
Qty: 90 TABLET | Refills: 1 | Status: SHIPPED | OUTPATIENT
Start: 2021-12-28 | End: 2022-02-24

## 2021-12-28 ASSESSMENT — MIFFLIN-ST. JEOR: SCORE: 1446.78

## 2021-12-28 NOTE — PATIENT INSTRUCTIONS
Dear Claudia Henson,    1. Today we discussed sleeping difficulty.  2. Please follow sleep hygiene recommendations.  3. Take melatonin 30 minutes before desired sleep. Take hydroxyzine once nightly for sleep and anxiety.  4. Take acetaminophen for foot discomfort.  5. Please follow up on sleep study. Contact clinic if not heard from .   6. Received vaccinations.    Please call [facility] with any questions or concerns.    Best regard,    Abhi Cedeno MD      [facility]  Phone: [phone]  Address: [address] [place] [id number]

## 2021-12-28 NOTE — NURSING NOTE
Due to patient being non-English speaking/uses sign language, an  was used for this visit. Only for face-to-face interpretation by an external agency, date and length of interpretation can be found on the scanned worksheet.     name: Heladio Nielson  Agency: [employer]  Language: Kira   Telephone number: [phone]  Type of interpretation: Telephone, spoken

## 2021-12-28 NOTE — PROGRESS NOTES
Assessment and Plan        Claudia was seen today for other, immunization, imm/inj and imm/inj.    Diagnoses and all orders for this visit:    Insomnia, unspecified type  Patient presents with chronic insomnia with prolonged sleep latency and multiple nighttime awakening.  He has anxiety and nightmares related to daughter falling in snow and being injured.  Also reports loud snoring, apneic breathing while asleep, and daytime fatigue.  Exam notable for patient has STOP-BANG score of 4.  Recommended patient follow-up for further discussion of anxiety.  For time being, reviewed sleep hygiene, melatonin use, and hydroxyzine use for anxiety and sleep.  Patient also endorsed some mild nonspecific slight discomfort, thus encouraged patient to use acetaminophen.  -     melatonin 3 MG tablet; Take 1 tablet (3 mg) by mouth nightly as needed for sleep  -     SLEEP EVALUATION & MANAGEMENT REFERRAL - ADULT -; Future  -     hydrOXYzine (VISTARIL) 25 MG capsule; Take 1 capsule (25 mg) by mouth daily as needed (Sleep)  -     acetaminophen (TYLENOL) 500 MG tablet; Take 1-2 tablets (500-1,000 mg) by mouth every 6 hours as needed for mild pain    Sleep apnea, unspecified type  As specified above, stop bang score of 4.  Encouraged for sleep apnea evaluation.  -     SLEEP EVALUATION & MANAGEMENT REFERRAL - ADULT -; Future    High priority for 2019-nCoV vaccine  -     COVID-19,PF,MODERNA (18+ Yrs Primary Series .5mL)    Need for vaccination  -     Pneumococcal vaccine 23 valent PPSV23  (Pneumovax) [02427]    Need for prophylactic vaccination and inoculation against influenza  -     INFLUENZA VACCINE IM > 6 MONTHS VALENT IIV4 (AFLURIA/FLUZONE)    Other orders  -     MODERNA COVID-19 VACCINE 2ND DOSE APPT; Future    Options for treatment and follow-up care were reviewed with the patient. Patient engaged in the decision making process and verbalized understanding of the options discussed and agreed with the final plan.    Patient was staffed  "with attending physician Dr. William.    Abhi Cedeno MD PGY2           HPI       Claudia Henson is a 36 year old year old male w/ PMH of There is no problem list on file for this patient.   who presents for   Chief Complaint   Patient presents with     Other     on and off  headache, very fatigue when moving alot, have problem sleeping, ringing ear      Immunization     flu shot, pfizer firt dose, PCV-13 or PPSC 23, tdap     Imm/Inj     COVID-19 VACCINE     Imm/Inj     Flu Shot       A [employer] Call  was used for  this visit.      Insomnia  Onset: 1-2 years    Description:   Time to fall asleep (sleep latency): 45 minutes  Middle of night awakening:  YES- multiple times; sleeps for ~2 hour intervals  Early morning awakening:    no    Progression of Symptoms:  same    Accompanying Signs & Symptoms:  Daytime sleepiness/napping:   Tired, but not sleeping  Excessive snoring/apnea:   YES- snoring and apnea  Restless legs:   Has bilateral pedal pain; unable to characterize discomfort  Frequent urination:   no  Chronic pain:    no    Precipitating factors:      Depression or anxiety?  YES-anxiety related to fear of daughter falling in snow  New stressful situation:  no  Caffeine intake:   no  OTC decongestants:   no  Any new medications:   no    Alleviating factors:           Alcohol use:    no  Self medicating :    no  Therapies Tried and outcome: Traditional Kira herb; helps a little.           Review of Systems:   10 point ROS negative other than as specified above.         Physical Exam:   /80 (BP Location: Left arm, Patient Position: Sitting, Cuff Size: Adult Regular)   Pulse 68   Temp 97.9  F (36.6  C) (Oral)   Resp 16   Ht 1.61 m (5' 3.39\")   Wt 61.6 kg (135 lb 11.2 oz)   SpO2 95%   BMI 23.75 kg/m      Vitals were reviewed and were normal     Exam:  Constitutional: healthy, alert, no distress, and cooperative  Head: Normocephalic  Neck: Neck supple. No adenopathy.  ENT: Large tongue with MP of " 3.   Cardiovascular: RRR w/o audible murmur  Respiratory: bilateral clear lungs w/o wheezing, crackles or rhonchi; breathing comfortably on RA  Gastrointestinal: Abdomen soft, non-tender.  Musculoskeletal: extremities normal- no gross deformities noted, gait normal, and normal muscle tone  Skin: no suspicious lesions or rashes  Neurologic: grossly normal CN; normal strength, sensation, & tone  Psychiatric: mentation appears normal and affect normal      Results:   Results are ordered and pending

## 2021-12-28 NOTE — PROGRESS NOTES
"Preceptor attestation:  Vital signs reviewed: /80 (BP Location: Left arm, Patient Position: Sitting, Cuff Size: Adult Regular)   Pulse 68   Temp 97.9  F (36.6  C) (Oral)   Resp 16   Ht 1.61 m (5' 3.39\")   Wt 61.6 kg (135 lb 11.2 oz)   SpO2 95%   BMI 23.75 kg/m      Patient seen, evaluated, and discussed with the resident.  I have verified the content of the note, which accurately reflects my assessment of the patient and the plan of care.    Supervising physician: Lavinia William MD  [facility]  "

## 2022-01-05 PROBLEM — G47.00 INSOMNIA, UNSPECIFIED TYPE: Status: ACTIVE | Noted: 2022-01-05

## 2022-01-25 ENCOUNTER — OFFICE VISIT (OUTPATIENT)
Dept: FAMILY MEDICINE | Facility: CLINIC | Age: 37
End: 2022-01-25
Payer: COMMERCIAL

## 2022-01-25 VITALS
RESPIRATION RATE: 16 BRPM | TEMPERATURE: 98 F | WEIGHT: 140.6 LBS | BODY MASS INDEX: 24.6 KG/M2 | SYSTOLIC BLOOD PRESSURE: 99 MMHG | OXYGEN SATURATION: 94 % | DIASTOLIC BLOOD PRESSURE: 70 MMHG | HEART RATE: 70 BPM

## 2022-01-25 DIAGNOSIS — G47.00 INSOMNIA, UNSPECIFIED TYPE: Primary | ICD-10-CM

## 2022-01-25 DIAGNOSIS — Z71.6 ENCOUNTER FOR SMOKING CESSATION COUNSELING: ICD-10-CM

## 2022-01-25 DIAGNOSIS — R09.81 NASAL CONGESTION: ICD-10-CM

## 2022-01-25 DIAGNOSIS — F17.200 TOBACCO USE DISORDER: ICD-10-CM

## 2022-01-25 DIAGNOSIS — Z23 HIGH PRIORITY FOR 2019-NCOV VACCINE: ICD-10-CM

## 2022-01-25 DIAGNOSIS — G47.30 SLEEP APNEA, UNSPECIFIED TYPE: ICD-10-CM

## 2022-01-25 PROCEDURE — 99214 OFFICE O/P EST MOD 30 MIN: CPT | Mod: 25 | Performed by: STUDENT IN AN ORGANIZED HEALTH CARE EDUCATION/TRAINING PROGRAM

## 2022-01-25 PROCEDURE — 0012A COVID-19,PF,MODERNA (18+ YRS PRIMARY SERIES .5ML): CPT | Performed by: STUDENT IN AN ORGANIZED HEALTH CARE EDUCATION/TRAINING PROGRAM

## 2022-01-25 PROCEDURE — 91301 COVID-19,PF,MODERNA (18+ YRS PRIMARY SERIES .5ML): CPT | Performed by: STUDENT IN AN ORGANIZED HEALTH CARE EDUCATION/TRAINING PROGRAM

## 2022-01-25 RX ORDER — HYDROXYZINE PAMOATE 25 MG/1
25 CAPSULE ORAL DAILY PRN
Qty: 30 CAPSULE | Refills: 1 | Status: SHIPPED | OUTPATIENT
Start: 2022-01-25 | End: 2022-02-24

## 2022-01-25 RX ORDER — NICOTINE 21 MG/24HR
1 PATCH, TRANSDERMAL 24 HOURS TRANSDERMAL EVERY 24 HOURS
Qty: 28 PATCH | Refills: 1 | Status: SHIPPED | OUTPATIENT
Start: 2022-01-25 | End: 2022-01-27

## 2022-01-25 RX ORDER — FLUTICASONE PROPIONATE 50 MCG
1 SPRAY, SUSPENSION (ML) NASAL 2 TIMES DAILY
Qty: 16 G | Refills: 3 | Status: SHIPPED | OUTPATIENT
Start: 2022-01-25 | End: 2022-01-27

## 2022-01-25 NOTE — PROGRESS NOTES
Assessment & Plan     1. Insomnia, unspecified type  2. Sleep apnea, unspecified type  Suspect is related to sleep apnea and also possibly some underlying PTSD.  Sleep hygiene previously discussed with Dr. Cedeno.  Sleep is improved with melatonin and hydroxyzine, but he still notes nighttime awakenings.  We encouraged him today to follow-up with sleep study and will route to RN with help for scheduling.  Additionally he is interested in CBT for both insomnia and tobacco use disorder.  Referral to lifestyle clinic placed today.  - hydrOXYzine (VISTARIL) 25 MG capsule; Take 1 capsule (25 mg) by mouth daily as needed (Sleep)  Dispense: 30 capsule; Refill: 1    3. Tobacco use disorder  4. Encounter for smoking cessation counseling  Smoking about 1 pack of cigarettes per week.  First within the first 30 minutes.  We will start with 14 mg nicotine patch and slowly taper down to 7 mg.  Additionally will use Nicorette lozenges in the meantime.  Also CBT through lifestyle clinic as noted above.  - nicotine (NICODERM CQ) 14 MG/24HR 24 hr patch; Place 1 patch onto the skin every 24 hours  Dispense: 28 patch; Refill: 1  - nicotine (NICORETTE) 4 MG lozenge; Place 1 lozenge (4 mg) inside cheek every hour as needed for smoking cessation  Dispense: 108 lozenge; Refill: 1    5. Nasal congestion  Notes this is been ongoing for some time now without any other infectious symptoms.  We will treat with Flonase and follow-up.  - fluticasone (FLONASE) 50 MCG/ACT nasal spray; Spray 1 spray into both nostrils 2 times daily  Dispense: 16 g; Refill: 3    6. High priority for 2019-nCoV vaccine  - COVID-19,PF,MODERNA (18+ Yrs Primary Series .5mL)      Prescription drug management  25 minutes spent on the date of the encounter doing chart review, history and exam, documentation and further activities per the note       Return in about 4 weeks (around 2/22/2022) for Follow up.    Susan Culp MD PGY3  [facility]  ITALIA Taylor is a 36 year old who presents for the following health issues:    HPI   Sleeping better, occasionally waking up. Sleeps for short periods at a time. Hasn't seen sleep specialist hasn't gotten call yet. Still snoring. Sometimes can wake up with heart pounding. Still tired during the day. Using melatonin and hydroxyzine, which has helped.     Interested in smoking cessation. He smokes about 1 pack of cigarettes per week (about 1-4 per day). First cigarette of the day is around 6AM, about 10-15 minutes after waking up. He smokes cigarettes because of cravings, but also habits (ie after eating, when tired). Motivated to quit for his health.     Review of Systems   Constitutional, HEENT, cardiovascular, pulmonary, gi and gu systems are negative, except as otherwise noted.      Objective    BP 99/70 (BP Location: Left arm, Patient Position: Sitting, Cuff Size: Adult Regular)   Pulse 70   Temp 98  F (36.7  C) (Oral)   Resp 16   Wt 63.8 kg (140 lb 9.6 oz)   SpO2 94%   BMI 24.60 kg/m    Body mass index is 24.6 kg/m .  Physical Exam   GENERAL: healthy, alert and no distress  EYES: Eyes grossly normal to inspection, PERRL and conjunctivae and sclerae normal  HENT: ear canals and TM's normal, nose and mouth without ulcers or lesions; mallampati IV   NECK: no adenopathy, no asymmetry, masses, or scars and thyroid normal to palpation  RESP: lungs clear to auscultation - no rales, rhonchi or wheezes  CV: regular rate and rhythm, normal S1 S2, no S3 or S4, no murmur, click or rub, no peripheral edema and peripheral pulses strong  SKIN: tattoos throughout

## 2022-01-25 NOTE — PATIENT INSTRUCTIONS
Use nicotine patch daily- replace old one every morning.     Use nicotine lozenges as needed, but try to avoid before bedtime.     Keep using melatonin or hydroxyzine.     Use nasal spray twice daily for 2 weeks. Monitor for improvement.     Someone will call to schedule sleep study and also appt with therapist.

## 2022-01-25 NOTE — NURSING NOTE
Due to patient being non-English speaking/uses sign language, an  was used for this visit. Only for face-to-face interpretation by an external agency, date and length of interpretation can be found on the scanned worksheet.     name: Delma  Agency: [employer]  Language: Kira   Telephone number: [phone]  Type of interpretation: Telephone, spoken

## 2022-01-27 ENCOUNTER — OFFICE VISIT (OUTPATIENT)
Dept: FAMILY MEDICINE | Facility: CLINIC | Age: 37
End: 2022-01-27
Payer: COMMERCIAL

## 2022-01-27 VITALS
TEMPERATURE: 98.3 F | RESPIRATION RATE: 18 BRPM | OXYGEN SATURATION: 96 % | SYSTOLIC BLOOD PRESSURE: 115 MMHG | HEART RATE: 76 BPM | DIASTOLIC BLOOD PRESSURE: 69 MMHG | BODY MASS INDEX: 24.78 KG/M2 | WEIGHT: 141.6 LBS

## 2022-01-27 DIAGNOSIS — R09.81 NASAL CONGESTION: ICD-10-CM

## 2022-01-27 DIAGNOSIS — F17.200 TOBACCO USE DISORDER: Primary | ICD-10-CM

## 2022-01-27 DIAGNOSIS — G47.00 INSOMNIA, UNSPECIFIED TYPE: ICD-10-CM

## 2022-01-27 PROCEDURE — 99213 OFFICE O/P EST LOW 20 MIN: CPT | Performed by: FAMILY MEDICINE

## 2022-01-27 RX ORDER — LANOLIN ALCOHOL/MO/W.PET/CERES
3 CREAM (GRAM) TOPICAL
Qty: 90 TABLET | Refills: 3 | Status: SHIPPED | OUTPATIENT
Start: 2022-01-27 | End: 2023-02-01

## 2022-01-27 RX ORDER — FLUTICASONE PROPIONATE 50 MCG
1 SPRAY, SUSPENSION (ML) NASAL 2 TIMES DAILY
Qty: 16 G | Refills: 3 | Status: SHIPPED | OUTPATIENT
Start: 2022-01-27 | End: 2023-02-01

## 2022-01-27 RX ORDER — NICOTINE 21 MG/24HR
1 PATCH, TRANSDERMAL 24 HOURS TRANSDERMAL EVERY 24 HOURS
Qty: 28 PATCH | Refills: 1 | Status: SHIPPED | OUTPATIENT
Start: 2022-01-27 | End: 2022-02-24

## 2022-01-27 NOTE — NURSING NOTE
Due to patient being non-English speaking/uses sign language, an  was used for this visit. Only for face-to-face interpretation by an external agency, date and length of interpretation can be found on the scanned worksheet.     name: Puneet  Agency: [employer]  Language: Kira   Telephone number: [phone]  Type of interpretation: Telephone, spoken

## 2022-01-28 NOTE — PROGRESS NOTES
Assessment & Plan     Tobacco use disorder  Returns for discussion on Quit process. Reviewed setting quit date (tomorrow 1/28/22). Where to place patch. When to change patch & how to dispose of it. Using gum prn  - nicotine (NICODERM CQ) 14 MG/24HR 24 hr patch; Place 1 patch onto the skin every 24 hours  - nicotine (NICORETTE) 4 MG lozenge; Place 1 lozenge (4 mg) inside cheek every hour as needed for smoking cessation    Nasal congestion  On/off throughout the year. Suspect seasonal rhinitis. No nasal polyps  - fluticasone (FLONASE) 50 MCG/ACT nasal spray; Spray 1 spray into both nostrils 2 times daily    Insomnia, unspecified type  Use of melatonin reviewed. Need to take daily over longer periods (next 1-2 months) reinforced  - melatonin 3 MG tablet; Take 1 tablet (3 mg) by mouth nightly as needed for sleep    Diagnosis or treatment significantly limited by social determinants of health - limited english and health literacy  28 minutes spent on the date of the encounter doing chart review, history and exam, documentation and further activities per the note    RTC 4 weeks.     Anjum Harkins MD  [facility]    Sindhu Taylor is a 36 year old who presents for the following health issues  accompanied by his .    HPI     Seen two days ago for a number of issues, but is unclear on aspect of care plan. Would like to know more about haw to quit smoking. Uncertain on how/when to use flonase. Ongoing sleep issues, wondering what to expect from melatonin.    Review of Systems   Constitutional, HEENT, cardiovascular, pulmonary, gi and gu systems are negative, except as otherwise noted.      Objective    /69 (BP Location: Right arm, Patient Position: Sitting, Cuff Size: Adult Regular)   Pulse 76   Temp 98.3  F (36.8  C) (Oral)   Resp 18   Wt 64.2 kg (141 lb 9.6 oz)   SpO2 96%   BMI 24.78 kg/m    Body mass index is 24.78 kg/m .  Physical Exam   GENERAL: healthy, alert and no  distress  NECK: no adenopathy, no asymmetry, masses, or scars and thyroid normal to palpation  RESP: lungs clear to auscultation - no rales, rhonchi or wheezes  CV: regular rate and rhythm, normal S1 S2, no S3 or S4, no murmur, click or rub, no peripheral edema and peripheral pulses strong  ABDOMEN: soft, nontender, no hepatosplenomegaly, no masses and bowel sounds normal  MS: no gross musculoskeletal defects noted, no edema

## 2022-01-31 NOTE — PROGRESS NOTES
Preceptor Attestation:  Vitals:    01/25/22 1107   BP: 99/70   BP Location: Left arm   Patient Position: Sitting   Cuff Size: Adult Regular   Pulse: 70   Resp: 16   Temp: 98  F (36.7  C)   TempSrc: Oral   SpO2: 94%   Weight: 63.8 kg (140 lb 9.6 oz)          I discussed the patient with the resident and evaluated the patient in person. I have verified the content of the note, which accurately reflects my assessment of the patient and the plan of care.   Supervising Physician:  Gloria Goel MD

## 2022-02-24 ENCOUNTER — OFFICE VISIT (OUTPATIENT)
Dept: FAMILY MEDICINE | Facility: CLINIC | Age: 37
End: 2022-02-24
Payer: COMMERCIAL

## 2022-02-24 VITALS
DIASTOLIC BLOOD PRESSURE: 70 MMHG | RESPIRATION RATE: 18 BRPM | TEMPERATURE: 98.4 F | BODY MASS INDEX: 25.3 KG/M2 | SYSTOLIC BLOOD PRESSURE: 106 MMHG | WEIGHT: 144.6 LBS | OXYGEN SATURATION: 98 % | HEART RATE: 71 BPM

## 2022-02-24 DIAGNOSIS — Z13.220 SCREENING FOR HYPERLIPIDEMIA: ICD-10-CM

## 2022-02-24 DIAGNOSIS — F17.290 OTHER TOBACCO PRODUCT NICOTINE DEPENDENCE, UNCOMPLICATED: Primary | ICD-10-CM

## 2022-02-24 DIAGNOSIS — Z11.59 NEED FOR HEPATITIS C SCREENING TEST: ICD-10-CM

## 2022-02-24 DIAGNOSIS — Z11.4 SCREENING FOR HIV (HUMAN IMMUNODEFICIENCY VIRUS): ICD-10-CM

## 2022-02-24 DIAGNOSIS — G47.00 INSOMNIA, UNSPECIFIED TYPE: ICD-10-CM

## 2022-02-24 LAB
ANION GAP SERPL CALCULATED.3IONS-SCNC: 9 MMOL/L (ref 5–18)
BUN SERPL-MCNC: 12 MG/DL (ref 8–22)
CALCIUM SERPL-MCNC: 9.3 MG/DL (ref 8.5–10.5)
CHLORIDE BLD-SCNC: 104 MMOL/L (ref 98–107)
CHOLEST SERPL-MCNC: 187 MG/DL
CO2 SERPL-SCNC: 25 MMOL/L (ref 22–31)
CREAT SERPL-MCNC: 0.87 MG/DL (ref 0.7–1.3)
ERYTHROCYTE [DISTWIDTH] IN BLOOD BY AUTOMATED COUNT: 13 % (ref 10–15)
FASTING STATUS PATIENT QL REPORTED: NORMAL
GFR SERPL CREATININE-BSD FRML MDRD: >90 ML/MIN/1.73M2
GLUCOSE BLD-MCNC: 83 MG/DL (ref 70–125)
HCT VFR BLD AUTO: 48.2 % (ref 40–53)
HDLC SERPL-MCNC: 48 MG/DL
HGB BLD-MCNC: 15.9 G/DL (ref 13.3–17.7)
HIV 1+2 AB+HIV1 P24 AG SERPL QL IA: NEGATIVE
LDLC SERPL CALC-MCNC: 115 MG/DL
MCH RBC QN AUTO: 28.1 PG (ref 26.5–33)
MCHC RBC AUTO-ENTMCNC: 33 G/DL (ref 31.5–36.5)
MCV RBC AUTO: 85 FL (ref 78–100)
PLATELET # BLD AUTO: 288 10E3/UL (ref 150–450)
POTASSIUM BLD-SCNC: 3.8 MMOL/L (ref 3.5–5)
RBC # BLD AUTO: 5.65 10E6/UL (ref 4.4–5.9)
SODIUM SERPL-SCNC: 138 MMOL/L (ref 136–145)
TRIGL SERPL-MCNC: 122 MG/DL
WBC # BLD AUTO: 7.2 10E3/UL (ref 4–11)

## 2022-02-24 PROCEDURE — 80048 BASIC METABOLIC PNL TOTAL CA: CPT | Performed by: FAMILY MEDICINE

## 2022-02-24 PROCEDURE — 99214 OFFICE O/P EST MOD 30 MIN: CPT | Performed by: FAMILY MEDICINE

## 2022-02-24 PROCEDURE — 85027 COMPLETE CBC AUTOMATED: CPT | Performed by: FAMILY MEDICINE

## 2022-02-24 PROCEDURE — T1013 SIGN LANG/ORAL INTERPRETER: HCPCS | Performed by: FAMILY MEDICINE

## 2022-02-24 PROCEDURE — 87389 HIV-1 AG W/HIV-1&-2 AB AG IA: CPT | Performed by: FAMILY MEDICINE

## 2022-02-24 PROCEDURE — 86803 HEPATITIS C AB TEST: CPT | Performed by: FAMILY MEDICINE

## 2022-02-24 PROCEDURE — 36415 COLL VENOUS BLD VENIPUNCTURE: CPT | Performed by: FAMILY MEDICINE

## 2022-02-24 PROCEDURE — 80061 LIPID PANEL: CPT | Performed by: FAMILY MEDICINE

## 2022-02-24 RX ORDER — ACETAMINOPHEN 500 MG
500-1000 TABLET ORAL EVERY 6 HOURS PRN
Qty: 90 TABLET | Refills: 1 | Status: SHIPPED | OUTPATIENT
Start: 2022-02-24 | End: 2023-02-01

## 2022-02-24 NOTE — NURSING NOTE
Due to patient being non-English speaking/uses sign language, an  was used for this visit. Only for face-to-face interpretation by an external agency, date and length of interpretation can be found on the scanned worksheet.     name: Flash Vidales  Agency: [employer]  Language: Kira   Telephone number: [phone]  Type of interpretation: Face-to-face, spoken

## 2022-02-25 LAB — HCV AB SERPL QL IA: NONREACTIVE

## 2022-02-25 NOTE — PATIENT INSTRUCTIONS
"Sleep \"much better\". Sleeping through night. Not awakening with dreams.\"  Only took melatonin for a few days: \"too tired the next morning.  In bed at 9:30-10pm. Asleep in 15-20 minutes. Does not recall awakening.  Was having \"burning\" (acid) in sternal/throat area which caused awakenings last spring, but this has resolved since H.Pyloi treated/  No witnessed apneas.  Wakes up around 6am, prior to his alarm going off. Gets kids up starting at 6:45am. 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 Not sleeping driving.  No napping in evening.  Still with some mid day headache, which tylenol helps. Requests refill on tylenol.    Down to smoking 2-3 cigs a day.  Tried patch for a few days, but irritated skin.  Did not like the smell of the gum. Thinks he can quit smoking when he is ready, would not like additional help.    I noted he had an appointment back here in Clinic March 2nd, mainly to \"talk about ways to get better sleep; talk about ways to stop smoking\"    Asks me to cancel his March 2nd visit, along with the sleep doctor consult; \"too busy with kids, driving\".    "

## 2022-02-25 NOTE — PROGRESS NOTES
"  Assessment & Plan     Insomnia, unspecified type  Sleep \"much better\". Sleeping through night. Not awakening with dreams.\"  Only took melatonin for a few days: \"too tired the next morning.  In bed at 9:30-10pm. Asleep in 15-20 minutes. Does not recall awakening.  Was having \"burning\" (acid) in sternal/throat area which caused awakenings last spring, but this has resolved since H.Westleyoi treated/  No witnessed apneas.  Wakes up around 6am, prior to his alarm going off. Gets kids up starting at 6:45am. 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 Not sleeping driving.  No napping in evening.  Still with some mid day headache, which tylenol helps. Requests refill on tylenol.    Down to smoking 2-3 cigs a day.  Tried patch for a few days, but irritated skin.  Did not like the smell of the gum. Thinks he can quit smoking when he is ready, would not like additional help.    I noted he had an appointment back here in Clinic March 2nd, mainly to \"talk about ways to get better sleep; talk about ways to stop smoking\"    Asks me to cancel his March 2nd visit, along with the sleep doctor consult; \"too busy with kids, driving\".    - acetaminophen (TYLENOL) 500 MG tablet; Take 1-2 tablets (500-1,000 mg) by mouth every 6 hours as needed for mild pain    Other tobacco product nicotine dependence, uncomplicated    - CBC with platelets; Future  - Basic metabolic panel; Future  - Basic metabolic panel  - CBC with platelets    Screening for HIV (human immunodeficiency virus)    - HIV Screening; Future  - HIV Screening    Need for hepatitis C screening test    - Hepatitis C Screen Reflex to HCV RNA Quant and Genotype; Future  - Hepatitis C Screen Reflex to HCV RNA Quant and Genotype    Screening for hyperlipidemia    - Lipid panel reflex to direct LDL Fasting; Future  - Lipid panel reflex to direct LDL Fasting    Diagnosis or treatment significantly limited by social determinants of health " "- language challanges  35 minutes spent on the date of the encounter doing chart review, history and exam, documentation and further activities per the note    Return in 2 months for Preventative visit with PCP    Anjum Harkins MD  [facility]    Sindhu Taylor is a 36 year old who presents for the following health issues  accompanied by his daughter and .    HPI     Sleep \"much better\". Sleeping through night. Not awakening with dreams.\"  Only took melatonin for a few days: \"too tired the next morning.  In bed at 9:30-10pm. Asleep in 15-20 minutes. Does not recall awakening.  Was having \"burning\" (acid) in sternal/throat area which caused awakenings last spring, but this has resolved since H.Pyloi treated/  No witnessed apneas.  Wakes up around 6am, prior to his alarm going off. Gets kids up starting at 6:45am. 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 Not sleeping driving.  No napping in evening.  Still with some mid day headache, which tylenol helps. Requests refill on tylenol.    Down to smoking 2-3 cigs a day.  Tried patch for a few days, but irritated skin.  Did not like the smell of the gum. Thinks he can quit smoking when he is ready, would not like additional help.    I noted he had an appointment back here in Clinic March 2nd, mainly to \"talk about ways to get better sleep; talk about ways to stop smoking\"    Asks me to cancel his March 2nd visit, along with the sleep doctor consult; \"too busy with kids, driving\".    Due for screening blood tests and does agree to having this drawn.    Due for Immunizations (see care gap), but declined today  Review of Systems   Constitutional, HEENT, cardiovascular, pulmonary, gi and gu systems are negative, except as otherwise noted.      Objective    /70 (BP Location: Left arm, Patient Position: Sitting, Cuff Size: Adult Regular)   Pulse 71   Temp 98.4  F (36.9  C) (Oral)   " Resp 18   Wt 65.6 kg (144 lb 9.6 oz)   SpO2 98%   BMI 25.30 kg/m    Body mass index is 25.3 kg/m .  Physical Exam   GENERAL: healthy, alert and no distress  NECK: no adenopathy, no asymmetry, masses, or scars and thyroid normal to palpation  RESP: lungs clear to auscultation - no rales, rhonchi or wheezes  CV: regular rate and rhythm, normal S1 S2, no S3 or S4, no murmur, click or rub, no peripheral edema and peripheral pulses strong  ABDOMEN: soft, nontender, no hepatosplenomegaly, no masses and bowel sounds normal  MS: no gross musculoskeletal defects noted, no edema    Results for orders placed or performed in visit on 02/24/22 (from the past 24 hour(s))   Basic metabolic panel   Result Value Ref Range    Sodium 138 136 - 145 mmol/L    Potassium 3.8 3.5 - 5.0 mmol/L    Chloride 104 98 - 107 mmol/L    Carbon Dioxide (CO2) 25 22 - 31 mmol/L    Anion Gap 9 5 - 18 mmol/L    Urea Nitrogen 12 8 - 22 mg/dL    Creatinine 0.87 0.70 - 1.30 mg/dL    Calcium 9.3 8.5 - 10.5 mg/dL    Glucose 83 70 - 125 mg/dL    GFR Estimate >90 >60 mL/min/1.73m2   CBC with platelets   Result Value Ref Range    WBC Count 7.2 4.0 - 11.0 10e3/uL    RBC Count 5.65 4.40 - 5.90 10e6/uL    Hemoglobin 15.9 13.3 - 17.7 g/dL    Hematocrit 48.2 40.0 - 53.0 %    MCV 85 78 - 100 fL    MCH 28.1 26.5 - 33.0 pg    MCHC 33.0 31.5 - 36.5 g/dL    RDW 13.0 10.0 - 15.0 %    Platelet Count 288 150 - 450 10e3/uL   Lipid panel reflex to direct LDL Fasting   Result Value Ref Range    Cholesterol 187 <=199 mg/dL    Triglycerides 122 <=149 mg/dL    Direct Measure HDL 48 >=40 mg/dL    LDL Cholesterol Calculated 115 <=129 mg/dL    Patient Fasting > 8hrs? Unknown    HIV Screening   Result Value Ref Range    HIV Antigen Antibody Combo Negative Negative

## 2022-06-03 ENCOUNTER — LAB (OUTPATIENT)
Dept: LAB | Facility: CLINIC | Age: 37
End: 2022-06-03
Payer: COMMERCIAL

## 2022-06-03 DIAGNOSIS — Z20.822 ENCOUNTER FOR LABORATORY TESTING FOR COVID-19 VIRUS: ICD-10-CM

## 2022-06-03 PROCEDURE — U0005 INFEC AGEN DETEC AMPLI PROBE: HCPCS

## 2022-06-03 PROCEDURE — U0003 INFECTIOUS AGENT DETECTION BY NUCLEIC ACID (DNA OR RNA); SEVERE ACUTE RESPIRATORY SYNDROME CORONAVIRUS 2 (SARS-COV-2) (CORONAVIRUS DISEASE [COVID-19]), AMPLIFIED PROBE TECHNIQUE, MAKING USE OF HIGH THROUGHPUT TECHNOLOGIES AS DESCRIBED BY CMS-2020-01-R: HCPCS

## 2022-06-04 LAB — SARS-COV-2 RNA RESP QL NAA+PROBE: NEGATIVE

## 2023-02-01 ENCOUNTER — OFFICE VISIT (OUTPATIENT)
Dept: FAMILY MEDICINE | Facility: CLINIC | Age: 38
End: 2023-02-01
Payer: COMMERCIAL

## 2023-02-01 VITALS
DIASTOLIC BLOOD PRESSURE: 70 MMHG | WEIGHT: 140.6 LBS | TEMPERATURE: 98.8 F | BODY MASS INDEX: 24.91 KG/M2 | OXYGEN SATURATION: 98 % | RESPIRATION RATE: 20 BRPM | HEART RATE: 86 BPM | HEIGHT: 63 IN | SYSTOLIC BLOOD PRESSURE: 107 MMHG

## 2023-02-01 DIAGNOSIS — L30.9 DERMATITIS: ICD-10-CM

## 2023-02-01 DIAGNOSIS — N41.1 CHRONIC PROSTATITIS: ICD-10-CM

## 2023-02-01 DIAGNOSIS — R35.0 URINE FREQUENCY: Primary | ICD-10-CM

## 2023-02-01 LAB
ALBUMIN SERPL BCG-MCNC: 4.2 G/DL (ref 3.5–5.2)
ALBUMIN UR-MCNC: ABNORMAL MG/DL
ALP SERPL-CCNC: 80 U/L (ref 40–129)
ALT SERPL W P-5'-P-CCNC: 20 U/L (ref 10–50)
ANION GAP SERPL CALCULATED.3IONS-SCNC: 11 MMOL/L (ref 7–15)
APPEARANCE UR: CLEAR
AST SERPL W P-5'-P-CCNC: 23 U/L (ref 10–50)
BACTERIA #/AREA URNS HPF: NORMAL /HPF
BILIRUB SERPL-MCNC: 0.7 MG/DL
BILIRUB UR QL STRIP: NEGATIVE
BUN SERPL-MCNC: 10.1 MG/DL (ref 6–20)
CALCIUM SERPL-MCNC: 9 MG/DL (ref 8.6–10)
CHLORIDE SERPL-SCNC: 105 MMOL/L (ref 98–107)
COLOR UR AUTO: YELLOW
CREAT SERPL-MCNC: 0.92 MG/DL (ref 0.67–1.17)
DEPRECATED HCO3 PLAS-SCNC: 23 MMOL/L (ref 22–29)
ERYTHROCYTE [DISTWIDTH] IN BLOOD BY AUTOMATED COUNT: 12.8 % (ref 10–15)
GFR SERPL CREATININE-BSD FRML MDRD: >90 ML/MIN/1.73M2
GLUCOSE SERPL-MCNC: 94 MG/DL (ref 70–99)
GLUCOSE UR STRIP-MCNC: NEGATIVE MG/DL
HCT VFR BLD AUTO: 44.6 % (ref 40–53)
HGB BLD-MCNC: 14.7 G/DL (ref 13.3–17.7)
HGB UR QL STRIP: NEGATIVE
KETONES UR STRIP-MCNC: 40 MG/DL
LEUKOCYTE ESTERASE UR QL STRIP: NEGATIVE
MCH RBC QN AUTO: 27.7 PG (ref 26.5–33)
MCHC RBC AUTO-ENTMCNC: 33 G/DL (ref 31.5–36.5)
MCV RBC AUTO: 84 FL (ref 78–100)
NITRATE UR QL: NEGATIVE
PH UR STRIP: 5.5 [PH] (ref 5–8)
PLATELET # BLD AUTO: 239 10E3/UL (ref 150–450)
POTASSIUM SERPL-SCNC: 3.5 MMOL/L (ref 3.4–5.3)
PROT SERPL-MCNC: 7.2 G/DL (ref 6.4–8.3)
PSA SERPL-MCNC: 0.65 NG/ML
RBC # BLD AUTO: 5.31 10E6/UL (ref 4.4–5.9)
RBC #/AREA URNS AUTO: NORMAL /HPF
SODIUM SERPL-SCNC: 139 MMOL/L (ref 136–145)
SP GR UR STRIP: >=1.03 (ref 1–1.03)
UROBILINOGEN UR STRIP-ACNC: 0.2 E.U./DL
WBC # BLD AUTO: 7.7 10E3/UL (ref 4–11)
WBC #/AREA URNS AUTO: NORMAL /HPF

## 2023-02-01 PROCEDURE — G0103 PSA SCREENING: HCPCS | Performed by: FAMILY MEDICINE

## 2023-02-01 PROCEDURE — 36415 COLL VENOUS BLD VENIPUNCTURE: CPT | Performed by: FAMILY MEDICINE

## 2023-02-01 PROCEDURE — 81001 URINALYSIS AUTO W/SCOPE: CPT | Performed by: FAMILY MEDICINE

## 2023-02-01 PROCEDURE — 80053 COMPREHEN METABOLIC PANEL: CPT | Performed by: FAMILY MEDICINE

## 2023-02-01 PROCEDURE — 99214 OFFICE O/P EST MOD 30 MIN: CPT | Performed by: FAMILY MEDICINE

## 2023-02-01 PROCEDURE — 85027 COMPLETE CBC AUTOMATED: CPT | Performed by: FAMILY MEDICINE

## 2023-02-01 RX ORDER — MINERAL OIL/HYDROPHIL PETROLAT
OINTMENT (GRAM) TOPICAL PRN
Qty: 420 G | Refills: 11 | Status: SHIPPED | OUTPATIENT
Start: 2023-02-01 | End: 2023-03-15

## 2023-02-01 RX ORDER — LEVOFLOXACIN 500 MG/1
500 TABLET, FILM COATED ORAL DAILY
Qty: 14 TABLET | Refills: 0 | Status: SHIPPED | OUTPATIENT
Start: 2023-02-01 | End: 2023-02-15

## 2023-02-01 RX ORDER — FLUOCINONIDE 0.5 MG/G
OINTMENT TOPICAL 2 TIMES DAILY
Qty: 60 G | Refills: 0 | Status: SHIPPED | OUTPATIENT
Start: 2023-02-01 | End: 2023-12-12

## 2023-02-01 NOTE — PROGRESS NOTES
Assessment & Plan     I am wondering if this could represent prostatitis.  Another consideration is just chronic irritation from dehydration.  Unfortunately, he is not able to give us a urine sample so we are having to make a decision based on a lot of information.  We will treat for prostatitis and have him increase water to at least 64 ounces a day.  We will have him follow-up in a few weeks.      Urine frequency    - UA reflex to Microscopic and Culture - Clinic Collect  - Urine Microscopic    Chronic prostatitis    - Prostate Specific Antigen Screen; Future  - CBC with platelets; Future  - Comprehensive metabolic panel; Future  - levofloxacin (LEVAQUIN) 500 MG tablet; Take 1 tablet (500 mg) by mouth daily for 14 days  - Prostate Specific Antigen Screen  - CBC with platelets  - Comprehensive metabolic panel    Dermatitis    - fluocinonide (LIDEX) 0.05 % external ointment; Apply topically 2 times daily  - mineral oil-hydrophilic petrolatum (AQUAPHOR) external ointment; Apply topically as needed for dry skin    Diagnosis or treatment significantly limited by social determinants of health - Language barrier and low health literacy.  25 minutes spent on the date of the encounter doing patient visit and documentation        Nicotine/Tobacco Cessation:  He reports that he has been smoking. He has been smoking an average of .5 packs per day. He has never used smokeless tobacco.  Nicotine/Tobacco Cessation Plan:     Enrique Antunez MD  [employer]    Options for treatment and/or follow-up care were reviewed with the patient. Claudia Henson was engaged and actively involved in the decision making process. He verbalized understanding of the options discussed and was satisfied with the final plan.        Subjective: Claudia Henson is a 37 year old coming in with the following concerns:    Pain felt suprapubic region before he urinates.  He does not urinate often and when he does it is a large volume.  " He reports incomplete voiding and having to void every 30 minutes.  IPSS is 0/3/4/4/2/0/3 = 16 (moderate)    He drinks almost no water during the day.  He drinks some Sprite.  He eats two meals a day.  No caffeine.  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    PMHX/PSHX/MEDS/ALLERGIES/SHX/FHX reviewed and updated in Epic.   ROS:   General: No fevers, chills   Head: No headache   CV: No chest pain or palpitations.   Resp: No shortness of breath. No cough. No hemoptysis.   GI: No nausea or vomiting.  No constipation or diarrhea.  Objective: /70   Pulse 86   Temp 98.8  F (37.1  C) (Oral)   Resp 20   Ht 1.608 m (5' 3.3\")   Wt 63.8 kg (140 lb 9.6 oz)   SpO2 98%   BMI 24.67 kg/m     Gen: Well nourished and in NAD   CV: RRR - no murmurs, rubs, or gallups  Pulm: Clear to auscultation without wheezing or crackles  : Prostate is mildly enlarged and somewhat tender but not particularly boggy and no nodules appreciated  Skin: Marked dermatitis noted on the left shin and on the right anterior ankle.  On the ankle it is quite lichenified.  Psych: Euthymic                 "

## 2023-02-01 NOTE — LETTER
February 3, 2023      Claudia Hernandez Sixto  [address] [place] [address]        Dear MrLynda,    We are writing to inform you of your test results.    These results are within the normal range for you.  Please follow up in the clinic as directed.       Resulted Orders   UA reflex to Microscopic and Culture - Clinic Collect   Result Value Ref Range    Color Urine Yellow Colorless, Straw, Light Yellow, Yellow    Appearance Urine Clear Clear    Glucose Urine Negative Negative mg/dL    Bilirubin Urine Negative Negative    Ketones Urine 40 (A) Negative mg/dL    Specific Gravity Urine >=1.030 1.005 - 1.030    Blood Urine Negative Negative    pH Urine 5.5 5.0 - 8.0    Protein Albumin Urine Trace (A) Negative mg/dL    Urobilinogen Urine 0.2 0.2, 1.0 E.U./dL    Nitrite Urine Negative Negative    Leukocyte Esterase Urine Negative Negative   Prostate Specific Antigen Screen   Result Value Ref Range    Prostate Specific Antigen Screen 0.65 ng/mL      Comment:      No reference ranges have been established for patients under 40 years.    Narrative    This result is obtained using the Roche Elecsys total PSA method on the olya [id number] immunoassay analyzer. Results obtained with different assay methods or kits cannot be used interchangeably.   CBC with platelets   Result Value Ref Range    WBC Count 7.7 4.0 - 11.0 10e3/uL    RBC Count 5.31 4.40 - 5.90 10e6/uL    Hemoglobin 14.7 13.3 - 17.7 g/dL    Hematocrit 44.6 40.0 - 53.0 %    MCV 84 78 - 100 fL    MCH 27.7 26.5 - 33.0 pg    MCHC 33.0 31.5 - 36.5 g/dL    RDW 12.8 10.0 - 15.0 %    Platelet Count 239 150 - 450 10e3/uL   Comprehensive metabolic panel   Result Value Ref Range    Sodium 139 136 - 145 mmol/L    Potassium 3.5 3.4 - 5.3 mmol/L    Chloride 105 98 - 107 mmol/L    Carbon Dioxide (CO2) 23 22 - 29 mmol/L    Anion Gap 11 7 - 15 mmol/L    Urea Nitrogen 10.1 6.0 - 20.0 mg/dL    Creatinine 0.92 0.67 - 1.17 mg/dL    Calcium 9.0 8.6 - 10.0 mg/dL    Glucose 94 70 - 99 mg/dL     Alkaline Phosphatase 80 40 - 129 U/L    AST 23 10 - 50 U/L    ALT 20 10 - 50 U/L    Protein Total 7.2 6.4 - 8.3 g/dL    Albumin 4.2 3.5 - 5.2 g/dL    Bilirubin Total 0.7 <=1.2 mg/dL    GFR Estimate >90 >60 mL/min/1.73m2      Comment:      eGFR calculated using 2021 CKD-EPI equation.   Urine Microscopic   Result Value Ref Range    Bacteria Urine None Seen None Seen /HPF    RBC Urine None Seen 0-2 /HPF /HPF    WBC Urine None Seen 0-5 /HPF /HPF    Narrative    Urine Culture not indicated       If you have any questions or concerns, please call the clinic at the number listed above.       Sincerely,      Enrique Antunez MD

## 2023-02-01 NOTE — NURSING NOTE
Due to patient being non-English speaking/uses sign language, an  was used for this visit. Only for face-to-face interpretation by an external agency, date and length of interpretation can be found on the scanned worksheet.       name: Gary He (Htoo)  Language: Kira  Agency:  [employer]  Phone number: [phone]  Type of interpretation:  Face-to-face, spoken

## 2023-02-15 ENCOUNTER — OFFICE VISIT (OUTPATIENT)
Dept: FAMILY MEDICINE | Facility: CLINIC | Age: 38
End: 2023-02-15
Payer: COMMERCIAL

## 2023-02-15 VITALS
HEART RATE: 75 BPM | TEMPERATURE: 98 F | SYSTOLIC BLOOD PRESSURE: 101 MMHG | DIASTOLIC BLOOD PRESSURE: 68 MMHG | OXYGEN SATURATION: 98 % | HEIGHT: 64 IN | WEIGHT: 138.2 LBS | RESPIRATION RATE: 20 BRPM | BODY MASS INDEX: 23.6 KG/M2

## 2023-02-15 DIAGNOSIS — N41.0 ACUTE PROSTATITIS: ICD-10-CM

## 2023-02-15 DIAGNOSIS — L30.9 DERMATITIS: Primary | ICD-10-CM

## 2023-02-15 PROCEDURE — 99213 OFFICE O/P EST LOW 20 MIN: CPT | Performed by: FAMILY MEDICINE

## 2023-02-15 RX ORDER — SULFAMETHOXAZOLE/TRIMETHOPRIM 800-160 MG
1 TABLET ORAL 2 TIMES DAILY
Qty: 28 TABLET | Refills: 0 | Status: SHIPPED | OUTPATIENT
Start: 2023-02-15 | End: 2023-03-01

## 2023-02-15 RX ORDER — BETAMETHASONE DIPROPIONATE 0.5 MG/G
CREAM TOPICAL 2 TIMES DAILY
Qty: 50 G | Refills: 0 | Status: SHIPPED | OUTPATIENT
Start: 2023-02-15

## 2023-02-15 NOTE — PROGRESS NOTES
Assessment & Plan     Dermatitis    - augmented betamethasone dipropionate (DIPROLENE AF) 0.05 % external cream; Apply topically 2 times daily    Acute prostatitis    - sulfamethoxazole-trimethoprim (BACTRIM DS) 800-160 MG tablet; Take 1 tablet by mouth 2 times daily for 14 days    Diagnosis or treatment significantly limited by social determinants of health - Language barrier and low health literacy.  25 minutes spent on the date of the encounter doing patient visit and documentation       Enrique Antunez MD  [facility] ITALIA Taylor is a 37 year old presenting for the following health issues:  Results and Recheck Medication (Pt states that the Levfloxacin gave him side effects- so stopped taking it last week- has 8 pills left)    He did not  the topical steroid because he was told at the pharmacy that his insurance would not pay for it.    HPI   He is doing well. Wanted to talk about results and abx  Stopped abx 1 week ago as he was having side effects - poor appetite, insomnia, fatigue. Side effects resolved after stopping the abx. Thinks the abx is too strong.     Symptoms are 50% better with medications, but SE are bad. Frequent urination is better and suprapubic pain is better. He normally works at night, and urinates about 3 times during the day. Good volume    Dermatitis improved with cream, but insurance didn't cover both (lidex and aquphor) it so had to pay out of pocket.    Skin: Marked dermatitis noted on the left shin (improved) and on the right anterior ankle (not improved).  On the ankle it is quite lichenified.      Review of Systems   Constitutional, HEENT, cardiovascular, pulmonary, gi and gu systems are negative, except as otherwise noted.    General: No fevers, chills   Head: No headache   CV: No chest pain or palpitations.   Resp: No shortness of breath. No cough. No hemoptysis.   GI: No nausea or vomiting.  No constipation or diarrhea.      Objective    BP  "101/68   Pulse 75   Temp 98  F (36.7  C) (Oral)   Resp 20   Ht 1.613 m (5' 3.5\")   Wt 62.7 kg (138 lb 3.2 oz)   SpO2 98%   BMI 24.10 kg/m    Body mass index is 24.1 kg/m .  Physical Exam   GENERAL: healthy, alert and no distress  RESP: lungs clear to auscultation - no rales, rhonchi or wheezes  CV: regular rate and rhythm, normal S1 S2, no S3 or S4, no murmur, click or rub, no peripheral edema and peripheral pulses strong              "

## 2023-03-15 ENCOUNTER — OFFICE VISIT (OUTPATIENT)
Dept: FAMILY MEDICINE | Facility: CLINIC | Age: 38
End: 2023-03-15
Payer: COMMERCIAL

## 2023-03-15 VITALS
DIASTOLIC BLOOD PRESSURE: 73 MMHG | WEIGHT: 139 LBS | OXYGEN SATURATION: 99 % | HEART RATE: 74 BPM | SYSTOLIC BLOOD PRESSURE: 108 MMHG | RESPIRATION RATE: 14 BRPM | BODY MASS INDEX: 24.24 KG/M2

## 2023-03-15 DIAGNOSIS — L28.0 NEURODERMATITIS: ICD-10-CM

## 2023-03-15 DIAGNOSIS — R39.9 LOWER URINARY TRACT SYMPTOMS: ICD-10-CM

## 2023-03-15 DIAGNOSIS — Z91.018 FOOD ALLERGY: Primary | ICD-10-CM

## 2023-03-15 PROCEDURE — 99213 OFFICE O/P EST LOW 20 MIN: CPT | Performed by: FAMILY MEDICINE

## 2023-03-15 PROCEDURE — 86003 ALLG SPEC IGE CRUDE XTRC EA: CPT | Performed by: FAMILY MEDICINE

## 2023-03-15 PROCEDURE — 36415 COLL VENOUS BLD VENIPUNCTURE: CPT | Performed by: FAMILY MEDICINE

## 2023-03-15 RX ORDER — HALOBETASOL PROPIONATE 0.5 MG/G
CREAM TOPICAL 2 TIMES DAILY
Qty: 15 G | Refills: 0 | Status: SHIPPED | OUTPATIENT
Start: 2023-03-15 | End: 2023-12-12

## 2023-03-15 RX ORDER — MINERAL OIL/HYDROPHIL PETROLAT
OINTMENT (GRAM) TOPICAL PRN
Qty: 420 G | Refills: 11 | Status: SHIPPED | OUTPATIENT
Start: 2023-03-15

## 2023-03-15 NOTE — PROGRESS NOTES
Assessment & Plan     1. Food allergy    - Allergy adult food panel; Future  - Allergen shrimp IgE; Future  - Allergen beef IgE; Future  - Allergy adult food panel  - Allergen shrimp IgE  - Allergen beef IgE  - UT SIGN LANGUAGE INTERP, FACE TO FACE, PER 15 MNS    2. Neurodermatitis    - halobetasol (ULTRAVATE) 0.05 % external cream; Apply topically 2 times daily  Dispense: 15 g; Refill: 0  - mineral oil-hydrophilic petrolatum (AQUAPHOR) external ointment; Apply topically as needed  Dispense: 420 g; Refill: 11    3. Lower urinary tract symptoms    - Adult Urology  Referral; Future    Diagnosis or treatment significantly limited by social determinants of health - Language barrier and low health literacy.  25 minutes spent on the date of the encounter doing patient visit and documentation       Enrique Antunez MD  [facility] ITALIA Taylor is a 37 year old presenting for the following health issues:  Follow Up (Urinary SX), Nicotine Dependence, and Derm Problem (Rash on his legs he was given a cream that seem to help some of the rash but not all- if hea eats scrimp or beef it gets itchy )    He has used the previous steroid cream and it helped some of his dermatitis but not he lichenified area on his right ankle.    He feels like he gets a rash when he eats shrimp.    He did not take his ABTX for presumed prostatitis.  He has now been given a sulfa drug and a flouroquinolone and he has not taken either because of side effects.      Review of Systems   Constitutional, HEENT, cardiovascular, pulmonary, gi and gu systems are negative, except as otherwise noted.    General: No fevers, chills   Head: No headache   CV: No chest pain or palpitations.   Resp: No shortness of breath. No cough. No hemoptysis.   GI: No nausea or vomiting.  No constipation or diarrhea.      Objective    /73 (BP Location: Left arm, Patient Position: Sitting, Cuff Size: Adult Regular)   Pulse 74    Resp 14   Wt 63 kg (139 lb)   SpO2 99%   BMI 24.24 kg/m    Body mass index is 24.24 kg/m .  Physical Exam   GENERAL: healthy, alert and no distress  RESP: lungs clear to auscultation - no rales, rhonchi or wheezes  CV: regular rate and rhythm, normal S1 S2, no S3 or S4, no murmur, click or rub, no peripheral edema and peripheral pulses strong

## 2023-03-15 NOTE — LETTER
March 21, 2023      Claudia Hernandez Sixto  [address] [place] [address]        Dear ,    We are writing to inform you of your test results.    Please follow-up to discuss the results of your allergy testing.  As it turns out your allergic to most fish and seafood.       [facility] Orders   Allergy adult food panel   Result Value Ref Range    Immunoglobulin E 2,039 (H) 0 - 114 kU/L    [place], Food IgE 0.31 (H) <0.10 KU(A)/L      Comment:      Interpretation: Low    Cashew, Food IgE <0.10 <0.10 KU(A)/L      Comment:      Interpretation: None Detected    Codfish IgE 0.28 (H) <0.10 KU(A)/L      Comment:      Interpretation: Low    Egg White IgE <0.10 <0.10 KU(A)/L      Comment:      Interpretation: None Detected    Hazelnut IgE 0.42 (H) <0.10 KU(A)/L      Comment:      Interpretation: Low    Milk, Cow IgE 0.16 (H) <0.10 KU(A)/L      Comment:      Interpretation: Low    Peanut IgE 0.43 (H) <0.10 KU(A)/L      Comment:      Interpretation: Low    [place] IgE <0.10 <0.10 KU(A)/L      Comment:      Interpretation: None Detected    Scallop IgE 0.74 (H) <0.10 KU(A)/L      Comment:      Interpretation: Moderate    Sesame Seed IgE 0.64 (H) <0.10 KU(A)/L      Comment:      Interpretation: Low    Shrimp IgE 1.14 (H) <0.10 KU(A)/L      Comment:      Interpretation: Moderate    Soybean IgE 0.30 (H) <0.10 KU(A)/L      Comment:      Interpretation: Low    Tuna IgE 0.12 (H) <0.10 KU(A)/L      Comment:      Interpretation: Low    [place], Food IgE 0.29 (H) <0.10 KU(A)/L      Comment:      Interpretation: Low    Wheat IgE 0.80 (H) <0.10 KU(A)/L      Comment:      Interpretation: Moderate    Narrative    ImmunoCAP Specific IgE Blood Test Quantitative Scoring  <0.10 kU(A)/L        Absent/undetectable  0.10-0.69 kU(A)/L    Low  0.70-3.49 kU(A)/L    Moderate  3.50-17.50 kU(A)/L   High  >17.50 kU(A)/L      Very High  Please note: In general, low IgE antibody levels indicate a low probability of clinical disease, whereas high antibody  levels to an allergen show good correlation with clinical disease.   Allergen shrimp IgE   Result Value Ref Range    Shrimp IgE 1.14 (H) <0.10 KU(A)/L      Comment:      Interpretation: Moderate    Narrative    ImmunoCAP Specific IgE Blood Test Quantitative Scoring  <0.10 kU(A)/L        Absent/undetectable  0.10-0.69 kU(A)/L    Low  0.70-3.49 kU(A)/L    Moderate  3.50-17.50 kU(A)/L   High  >17.50 kU(A)/L      Very High  Please note: In general, low IgE antibody levels indicate a low probability of clinical disease, whereas high antibody levels to an allergen show good correlation with clinical disease.   Allergen beef IgE   Result Value Ref Range    Beef IgE 0.37 (H) <0.10 KU(A)/L      Comment:      Interpretation: Low    Narrative    ImmunoCAP Specific IgE Blood Test Quantitative Scoring  <0.10 kU(A)/L        Absent/undetectable  0.10-0.69 kU(A)/L    Low  0.70-3.49 kU(A)/L    Moderate  3.50-17.50 kU(A)/L   High  >17.50 kU(A)/L      Very High  Please note: In general, low IgE antibody levels indicate a low probability of clinical disease, whereas high antibody levels to an allergen show good correlation with clinical disease.       If you have any questions or concerns, please call the clinic at the number listed above.       Sincerely,      Enrique Antunez MD

## 2023-03-15 NOTE — LETTER
March 21, 2023      Claudia Hernandez Sixto  [address] [place] [address]        Dear MrLynda,    We are writing to inform you of your test results.    You are very allergic to shrimp and somewhat allergic to beef.  You should definitely avoid shrimp from now on and, if you are having symptoms after eating beef, you should avoid that as well.       Resulted Orders   Allergen shrimp IgE   Result Value Ref Range    Shrimp IgE 1.14 (H) <0.10 KU(A)/L      Comment:      Interpretation: Moderate    Narrative    ImmunoCAP Specific IgE Blood Test Quantitative Scoring  <0.10 kU(A)/L        Absent/undetectable  0.10-0.69 kU(A)/L    Low  0.70-3.49 kU(A)/L    Moderate  3.50-17.50 kU(A)/L   High  >17.50 kU(A)/L      Very High  Please note: In general, low IgE antibody levels indicate a low probability of clinical disease, whereas high antibody levels to an allergen show good correlation with clinical disease.   Allergen beef IgE   Result Value Ref Range    Beef IgE 0.37 (H) <0.10 KU(A)/L      Comment:      Interpretation: Low    Narrative    ImmunoCAP Specific IgE Blood Test Quantitative Scoring  <0.10 kU(A)/L        Absent/undetectable  0.10-0.69 kU(A)/L    Low  0.70-3.49 kU(A)/L    Moderate  3.50-17.50 kU(A)/L   High  >17.50 kU(A)/L      Very High  Please note: In general, low IgE antibody levels indicate a low probability of clinical disease, whereas high antibody levels to an allergen show good correlation with clinical disease.       If you have any questions or concerns, please call the clinic at the number listed above.       Sincerely,      Enrique Antunez MD

## 2023-03-16 LAB
BEEF IGE QN: 0.37 KU(A)/L
SHRIMP IGE QN: 1.14 KU(A)/L

## 2023-03-16 NOTE — RESULT ENCOUNTER NOTE
You are very allergic to shrimp and somewhat allergic to beef.  You should definitely avoid shrimp from now on and, if you are having symptoms after eating beef, you should avoid that as well.

## 2023-03-17 LAB
ALMOND IGE QN: 0.31 KU(A)/L
CASHEW NUT IGE QN: <0.1 KU(A)/L
CODFISH IGE QN: 0.28 KU(A)/L
COW MILK IGE QN: 0.16 KU(A)/L
EGG WHITE IGE QN: <0.1 KU(A)/L
HAZELNUT IGE QN: 0.42 KU(A)/L
IGE SERPL-ACNC: 2039 KU/L (ref 0–114)
PEANUT IGE QN: 0.43 KU(A)/L
SALMON IGE QN: <0.1 KU(A)/L
SCALLOP IGE QN: 0.74 KU(A)/L
SESAME SEED IGE QN: 0.64 KU(A)/L
SHRIMP IGE QN: 1.14 KU(A)/L
SOYBEAN IGE QN: 0.3 KU(A)/L
TUNA IGE QN: 0.12 KU(A)/L
WALNUT IGE QN: 0.29 KU(A)/L
WHEAT IGE QN: 0.8 KU(A)/L

## 2023-03-17 NOTE — RESULT ENCOUNTER NOTE
Please follow-up to discuss the results of your allergy testing.  As it turns out your allergic to most fish and seafood.

## 2023-03-24 NOTE — TELEPHONE ENCOUNTER
MEDICAL RECORDS REQUEST   [facility]  Urology Clinic  [address] [place] [address]  PHONE: [phone]  Fax: [phone]        FUTURE VISIT INFORMATION                                                   Claudia Stef Henson, : 1985 scheduled for future visit at [facility]    APPOINTMENT INFORMATION:    Date: 2023    Provider:  Gloria Jones CNP    Reason for Visit/Diagnosis: LUTS    REFERRAL INFORMATION:    Referring provider:  Enrique Antunez MD in [facility]      RECORDS REQUESTED FOR VISIT                                                     NOTES  STATUS/DETAILS   OFFICE NOTE from referring provider  yes, 03/15/2023 -- Enrique Antunez MD in [facility]   MEDICATION LIST  yes   LABS     URINALYSIS (UA)  yes     PRE-VISIT CHECKLIST      Record collection complete Yes   Appointment appropriately scheduled           (right time/right provider) Yes   Joint diagnostic appointment coordinated correctly          (ensure right order & amount of time) Yes   MyChart activation If no, please explain PENDING   Questionnaire complete If no, please explain PENDING

## 2023-05-30 ENCOUNTER — PRE VISIT (OUTPATIENT)
Dept: UROLOGY | Facility: CLINIC | Age: 38
End: 2023-05-30
Payer: COMMERCIAL

## 2023-05-30 NOTE — TELEPHONE ENCOUNTER
Reason for visit: consult      Dx/Hx/Sx: frequency     Records/imaging/labs/orders: in epic    At Rooming: needs Kira

## 2023-06-19 NOTE — PROGRESS NOTES
Assessment and Plan:     Assessment: 38 year old male with nocturia every 2 hours associated with bladder pain that wakes him up. Minimal symptoms during the day. PVR low today at 40 mL. We discussed ddx of prostatitis, OAB and pelvic floor dysfunction. Discussed options for management, including trialing a medication for OAB versus evaluation by pelvic floor PT, or both. He is open to trying a medication, so will start him on oxybutynin.    Plan:  -Start oxybutynin XL 10 mg daily.  -If he does well with this, he will see me back in one year. If he continues to have symptoms, he will follow up with me and will then consider physical therapy referral.     Gloria Jones, CNP  Department of Urology           Chief Complaint:   Pelvic symptoms         History of Present Illness:    Claudia Henson is a 38 year old male who presents for consult regarding pelvic symptoms. He was seen by family practice in February and reported suprapubic pain with urination, and frequent, seemingly incomplete voids. UA checked on 2/1 and was negative for infection. PSA low normal at 0.65. Was treated for suspected prostatitis with 14 days of Levaquin.    Patient was seen today with the assistance of a professional Kira . He states that he only took 1-2 pills of the Levaquin prescription because it was causing bothersome side effects. He continues to experience nocturia every 1-2 hours that is associated with bladder pain. This pain is relieved after he empties his bladder. He does feel like he empties completely, but then it feels like his bladder is full again 2 hours later. He denies any stream issues, and also denies fevers of gross hematuria.          Past Medical History:   No past medical history on file.         Past Surgical History:   No past surgical history on file.         Medications     Current Outpatient Medications   Medication     augmented betamethasone dipropionate (DIPROLENE AF) 0.05 % external  "cream     halobetasol (ULTRAVATE) 0.05 % external cream     mineral oil-hydrophilic petrolatum (AQUAPHOR) external ointment     fluocinonide (LIDEX) 0.05 % external ointment     No current facility-administered medications for this visit.            Allergies:   Patient has no known allergies.         Review of Systems:  From intake questionnaire   Negative 14 system review except as noted on HPI, nurse's note.         Physical Exam:   Patient is a 38 year old  male   Vitals: Blood pressure 106/72, pulse 74, height 1.613 m (5' 3.5\"), weight 63 kg (139 lb).  General Appearance Adult: Alert, no acute distress, oriented  Lungs: no respiratory distress, or pursed lip breathing  Heart: No obvious jugular venous distension present  Abdomen: soft, nontender, no organomegaly or masses, Body mass index is 24.24 kg/m .  : deferred     Uroflow and Post-Void Residual by Bladder Scan     PVR: 40 mL      Labs and Pathology:    I personally reviewed all applicable laboratory data and went over findings with patient  Significant for:    CBC RESULTS:  Recent Labs   Lab Test 02/01/23  1523 02/24/22  1022 11/16/21  1233   WBC 7.7 7.2  --    HGB 14.7 15.9 16.2    288  --         BMP RESULTS:  Recent Labs   Lab Test 02/01/23  1523 02/24/22  1022    138   POTASSIUM 3.5 3.8   CHLORIDE 105 104   CO2 23 25   ANIONGAP 11 9   GLC 94 83   BUN 10.1 12   CR 0.92 0.87   GFRESTIMATED >90 >90   YAMILA 9.0 9.3       UA RESULTS:   Recent Labs   Lab Test 02/01/23  1442   SG >=1.030   URINEPH 5.5   NITRITE Negative   RBCU None Seen   WBCU None Seen       PSA RESULTS  Prostate Specific Antigen Screen   Date Value Ref Range Status   02/01/2023 0.65 ng/mL Final     Comment:     No reference ranges have been established for patients under 40 years.     "

## 2023-06-20 ENCOUNTER — OFFICE VISIT (OUTPATIENT)
Dept: UROLOGY | Facility: CLINIC | Age: 38
End: 2023-06-20
Attending: FAMILY MEDICINE
Payer: COMMERCIAL

## 2023-06-20 ENCOUNTER — PRE VISIT (OUTPATIENT)
Dept: UROLOGY | Facility: CLINIC | Age: 38
End: 2023-06-20

## 2023-06-20 VITALS
BODY MASS INDEX: 23.73 KG/M2 | HEART RATE: 74 BPM | DIASTOLIC BLOOD PRESSURE: 72 MMHG | SYSTOLIC BLOOD PRESSURE: 106 MMHG | WEIGHT: 139 LBS | HEIGHT: 64 IN

## 2023-06-20 DIAGNOSIS — R35.1 NOCTURIA: Primary | ICD-10-CM

## 2023-06-20 DIAGNOSIS — R39.9 LOWER URINARY TRACT SYMPTOMS: ICD-10-CM

## 2023-06-20 DIAGNOSIS — R39.89 BLADDER PAIN: ICD-10-CM

## 2023-06-20 PROCEDURE — 51798 US URINE CAPACITY MEASURE: CPT | Performed by: NURSE PRACTITIONER

## 2023-06-20 PROCEDURE — 99203 OFFICE O/P NEW LOW 30 MIN: CPT | Mod: 25 | Performed by: NURSE PRACTITIONER

## 2023-06-20 RX ORDER — OXYBUTYNIN CHLORIDE 10 MG/1
10 TABLET, EXTENDED RELEASE ORAL DAILY
Qty: 30 TABLET | Refills: 11 | Status: SHIPPED | OUTPATIENT
Start: 2023-06-20 | End: 2023-12-12

## 2023-06-20 ASSESSMENT — PAIN SCALES - GENERAL: PAINLEVEL: NO PAIN (0)

## 2023-06-20 NOTE — LETTER
6/20/2023       RE: Claudia Henson  [address]  [place] [address]     Dear Colleague,    Thank you for referring your patient, Claudia Henson, to the [facility] [place] at [facility]. Please see a copy of my visit note below.         Assessment and Plan:     Assessment: 38 year old male with nocturia every 2 hours associated with bladder pain that wakes him up. Minimal symptoms during the day. PVR low today at 40 mL. We discussed ddx of prostatitis, OAB and pelvic floor dysfunction. Discussed options for management, including trialing a medication for OAB versus evaluation by pelvic floor PT, or both. He is open to trying a medication, so will start him on oxybutynin.    Plan:  -Start oxybutynin XL 10 mg daily.  -If he does well with this, he will see me back in one year. If he continues to have symptoms, he will follow up with me and will then consider physical therapy referral.     Gloria Jones, CNP  Department of Urology           Chief Complaint:   Pelvic symptoms         History of Present Illness:    Claudia Henson is a 38 year old male who presents for consult regarding pelvic symptoms. He was seen by family practice in February and reported suprapubic pain with urination, and frequent, seemingly incomplete voids. UA checked on 2/1 and was negative for infection. PSA low normal at 0.65. Was treated for suspected prostatitis with 14 days of Levaquin.    Patient was seen today with the assistance of a professional Kira . He states that he only took 1-2 pills of the Levaquin prescription because it was causing bothersome side effects. He continues to experience nocturia every 1-2 hours that is associated with bladder pain. This pain is relieved after he empties his bladder. He does feel like he empties completely, but then it feels like his bladder is full again 2 hours later. He denies any stream issues, and  "also denies fevers of gross hematuria.          Past Medical History:   No past medical history on file.         Past Surgical History:   No past surgical history on file.         Medications     Current Outpatient Medications   Medication    augmented betamethasone dipropionate (DIPROLENE AF) 0.05 % external cream    halobetasol (ULTRAVATE) 0.05 % external cream    mineral oil-hydrophilic petrolatum (AQUAPHOR) external ointment    fluocinonide (LIDEX) 0.05 % external ointment     No current facility-administered medications for this visit.            Allergies:   Patient has no known allergies.         Review of Systems:  From intake questionnaire   Negative 14 system review except as noted on HPI, nurse's note.         Physical Exam:   Patient is a 38 year old  male   Vitals: Blood pressure 106/72, pulse 74, height 1.613 m (5' 3.5\"), weight 63 kg (139 lb).  General Appearance Adult: Alert, no acute distress, oriented  Lungs: no respiratory distress, or pursed lip breathing  Heart: No obvious jugular venous distension present  Abdomen: soft, nontender, no organomegaly or masses, Body mass index is 24.24 kg/m .  : deferred     Uroflow and Post-Void Residual by Bladder Scan     PVR: 40 mL      Labs and Pathology:    I personally reviewed all applicable laboratory data and went over findings with patient  Significant for:    CBC RESULTS:  Recent Labs   Lab Test 02/01/23  1523 02/24/22  1022 11/16/21  1233   WBC 7.7 7.2  --    HGB 14.7 15.9 16.2    288  --         BMP RESULTS:  Recent Labs   Lab Test 02/01/23  1523 02/24/22  1022    138   POTASSIUM 3.5 3.8   CHLORIDE 105 104   CO2 23 25   ANIONGAP 11 9   GLC 94 83   BUN 10.1 12   CR 0.92 0.87   GFRESTIMATED >90 >90   YAMILA 9.0 9.3       UA RESULTS:   Recent Labs   Lab Test 02/01/23  1442   SG >=1.030   URINEPH 5.5   NITRITE Negative   RBCU None Seen   WBCU None Seen       PSA RESULTS  Prostate Specific Antigen Screen   Date Value Ref Range Status "   02/01/2023 0.65 ng/mL Final     Comment:     No reference ranges have been established for patients under 40 years.

## 2023-06-20 NOTE — NURSING NOTE
Chief Complaint   Patient presents with     Consult     Lower urinary tract symptoms     Karina Clement, CMA

## 2023-06-20 NOTE — PATIENT INSTRUCTIONS
UROLOGY CLINIC VISIT PATIENT INSTRUCTIONS    -Start the oxybutynin XL 10 mg daily.   -Follow up with me as needed based on response to this. Will otherwise plan to see you in one year.     If you have any issues, questions or concerns in the meantime, do not hesitate to contact us at [phone] or via G[employer].     Gloria Jones, CNP  Department of Urology

## 2023-12-12 ENCOUNTER — OFFICE VISIT (OUTPATIENT)
Dept: FAMILY MEDICINE | Facility: CLINIC | Age: 38
End: 2023-12-12
Payer: COMMERCIAL

## 2023-12-12 VITALS
HEIGHT: 64 IN | BODY MASS INDEX: 25.03 KG/M2 | DIASTOLIC BLOOD PRESSURE: 76 MMHG | RESPIRATION RATE: 20 BRPM | TEMPERATURE: 98.6 F | SYSTOLIC BLOOD PRESSURE: 110 MMHG | WEIGHT: 146.6 LBS | OXYGEN SATURATION: 94 % | HEART RATE: 75 BPM

## 2023-12-12 DIAGNOSIS — R39.89 BLADDER PAIN: ICD-10-CM

## 2023-12-12 DIAGNOSIS — L30.9 DERMATITIS: Primary | ICD-10-CM

## 2023-12-12 DIAGNOSIS — L28.0 NEURODERMATITIS: ICD-10-CM

## 2023-12-12 DIAGNOSIS — R39.9 LOWER URINARY TRACT SYMPTOMS (LUTS): ICD-10-CM

## 2023-12-12 DIAGNOSIS — R35.1 NOCTURIA: ICD-10-CM

## 2023-12-12 PROCEDURE — 99213 OFFICE O/P EST LOW 20 MIN: CPT | Performed by: FAMILY MEDICINE

## 2023-12-12 RX ORDER — BETAMETHASONE DIPROPIONATE 0.05 %
OINTMENT (GRAM) TOPICAL 2 TIMES DAILY
Qty: 60 G | Refills: 1 | Status: SHIPPED | OUTPATIENT
Start: 2023-12-12

## 2023-12-12 RX ORDER — HALOBETASOL PROPIONATE 0.5 MG/G
CREAM TOPICAL 2 TIMES DAILY
Qty: 15 G | Refills: 0 | Status: SHIPPED | OUTPATIENT
Start: 2023-12-12 | End: 2023-12-12

## 2023-12-12 RX ORDER — OXYBUTYNIN CHLORIDE 10 MG/1
10 TABLET, EXTENDED RELEASE ORAL DAILY
Qty: 30 TABLET | Refills: 11 | Status: SHIPPED | OUTPATIENT
Start: 2023-12-12

## 2023-12-12 NOTE — PROGRESS NOTES
"  Assessment & Plan     Neurodermatitis  I think this is a combination of contact dermatitis leading to neurodermatitis.  I encouraged him to cover his skin with something such as Aquaphor and then wrapped that with Saran wrap before putting on his socks and then wearing his work boots.  It seems that his work boots are contributing to the problem.  - betamethasone dipropionate (DIPROSONE) 0.05 % external ointment; Apply topically 2 times daily      Lower urinary tract symptoms (LUTS)  He has been evaluated by urology in the past.  They just prescribed oxybutynin.  He states it works well when he takes it but he is now run out.  We will refill this.  - oxyBUTYnin ER (DITROPAN XL) 10 MG 24 hr tablet; Take 1 tablet (10 mg) by mouth daily    Diagnosis or treatment significantly limited by social determinants of health - language barrier and low health literacy.  20 minutes spent by me on the date of the encounter doing chart review, patient visit, and documentation        BMI:   Estimated body mass index is 25.16 kg/m  as calculated from the following:    Height as of this encounter: 1.626 m (5' 4\").    Weight as of this encounter: 66.5 kg (146 lb 9.6 oz).       Enrique Antunez MD  [facility] ITALIA Taylor is a 38 year old, presenting for the following health issues:  Derm Problem (Itchy skin- needs refill on cream)      12/12/2023     3:40 PM   Additional Questions   Roomed by Mao   Accompanied by self       HPI     Patient is here with a few concerns.  He continues to have lower urinary tract symptoms.  I had evaluated him previously and sent him to urology given his young age.  He was started on Ditropan and the patient reports this has helped him with his symptoms but he has now run out of this and would like a refill.    He is having worsening rash particularly on the dorsal aspect of the ankles.  It sounds like he has rubber boots that he wears at work and that he is feet may sweat a " "fair bit and there may be irritation from the rubber and this might be contributing to his problem.    Review of Systems   Constitutional, HEENT, cardiovascular, pulmonary, gi and gu systems are negative, except as otherwise noted.      Objective    /76   Pulse 75   Temp 98.6  F (37  C) (Oral)   Resp 20   Ht 1.626 m (5' 4\")   Wt 66.5 kg (146 lb 9.6 oz)   SpO2 94%   BMI 25.16 kg/m    Body mass index is 25.16 kg/m .  Physical Exam   GENERAL: healthy, alert and no distress  RESP: lungs clear to auscultation - no rales, rhonchi or wheezes  CV: regular rate and rhythm, normal S1 S2, no S3 or S4, no murmur  Skin: Patient has marked dermatitis noted on the dorsal aspect of the ankles bilaterally.  There is significant lichenification.        "

## 2024-02-22 ENCOUNTER — OFFICE VISIT (OUTPATIENT)
Dept: FAMILY MEDICINE | Facility: CLINIC | Age: 39
End: 2024-02-22
Payer: COMMERCIAL

## 2024-02-22 VITALS
OXYGEN SATURATION: 96 % | RESPIRATION RATE: 18 BRPM | SYSTOLIC BLOOD PRESSURE: 105 MMHG | HEART RATE: 67 BPM | WEIGHT: 150 LBS | HEIGHT: 65 IN | BODY MASS INDEX: 24.99 KG/M2 | TEMPERATURE: 98 F | DIASTOLIC BLOOD PRESSURE: 70 MMHG

## 2024-02-22 DIAGNOSIS — K12.0 APHTHOUS ULCER: Primary | ICD-10-CM

## 2024-02-22 DIAGNOSIS — Z71.6 ENCOUNTER FOR SMOKING CESSATION COUNSELING: ICD-10-CM

## 2024-02-22 PROCEDURE — 99213 OFFICE O/P EST LOW 20 MIN: CPT | Mod: GC

## 2024-02-22 RX ORDER — POLYETHYLENE GLYCOL 3350 17 G
2 POWDER IN PACKET (EA) ORAL
Qty: 168 LOZENGE | Refills: 3 | Status: SHIPPED | OUTPATIENT
Start: 2024-02-22

## 2024-02-22 NOTE — PROGRESS NOTES
Preceptor Attestation:    I discussed the patient with the resident and evaluated the patient in person. I have verified the content of the note, which accurately reflects my assessment of the patient and the plan of care.   Supervising Physician:  Jaime Gonzales MD.

## 2024-02-22 NOTE — PROGRESS NOTES
"Assessment & Plan   Aphthous ulcer  38-year-old male presenting with 1 week of mouth sores and pain.  On exam it is consistent with aphthous ulcer, less concerning for malignant process.  Recommended supportive cares and salt water gargles.  Recommended that he follow-up should this not resolve.  Patient in agreement with plan.    Encounter for smoking cessation counseling  3 cigarettes a day currently.   - nicotine (COMMIT) 2 MG lozenge  Dispense: 168 lozenge; Refill: 3      Nicotine/Tobacco Cessation  He reports that he has been smoking cigarettes. He has been smoking an average of 0.5 packs per day. He has never used smokeless tobacco.  Nicotine/Tobacco Cessation Plan    BMI  Estimated body mass index is 25.21 kg/m  as calculated from the following:    Height as of this encounter: 1.643 m (5' 4.69\").    Weight as of this encounter: 68 kg (150 lb).     Return if symptoms worsen or fail to improve.    Sindhu Taylor is a 38 year old, presenting for the following health issues:  Mouth Problem (Mouth sore about a week.)        2/22/2024     2:28 PM   Additional Questions   Roomed by Paul BURCH   Accompanied by Self         2/22/2024    Information    services provided? Yes   Language Kira   Type of interpretation provided Face-to-face    name [facility]    Agency [employer]     \[facility] [place] [facility]\""   Patient reports one week of discomfort and an ulcer in his upper right mouth behind the last molar.  No discharge from this ulcer, no known trauma, and no fevers, chills, or trouble eating or drinking.  He has never had anything like this before.      He also reports smoking, about 3 cigarettes a day, and would like to cut down on this.  He has tried nicotine patches and gum in the past and does not like these.     Review of Systems  Constitutional, neuro, ENT, endocrine, pulmonary, cardiac, gastrointestinal, genitourinary, musculoskeletal, integument and psychiatric systems are negative, except as " "otherwise noted.      Objective    /70   Pulse 67   Temp 98  F (36.7  C) (Oral)   Resp 18   Ht 1.643 m (5' 4.69\")   Wt 68 kg (150 lb)   SpO2 96%   BMI 25.21 kg/m    Body mass index is 25.21 kg/m .  Physical Exam   Gen: Awake, alert, well appearing  HEENT: Atraumatic, 2-3mm ulceration behind the right upper posterior molar with some surrounding swelling, no fluctuance or discharge.  CV: Regular rate and normal rhythm, no murmurs  Resp: Clear to auscultation bilaterally, no wheezes, rhonchi, or rales  Abd: Non-distended  MSK: Moving all extremities spontaneously  Neuro: Non-focal  Skin: No rashes.    Signed Electronically by: DO Lance Foy, MS3  [facility]  "

## 2024-04-11 ENCOUNTER — OFFICE VISIT (OUTPATIENT)
Dept: FAMILY MEDICINE | Facility: CLINIC | Age: 39
End: 2024-04-11
Payer: COMMERCIAL

## 2024-04-11 VITALS
DIASTOLIC BLOOD PRESSURE: 75 MMHG | BODY MASS INDEX: 26.02 KG/M2 | HEIGHT: 64 IN | TEMPERATURE: 98.2 F | SYSTOLIC BLOOD PRESSURE: 113 MMHG | OXYGEN SATURATION: 97 % | HEART RATE: 70 BPM | RESPIRATION RATE: 14 BRPM | WEIGHT: 152.4 LBS

## 2024-04-11 DIAGNOSIS — Z71.85 VACCINE COUNSELING: ICD-10-CM

## 2024-04-11 DIAGNOSIS — M26.609 TMJ (TEMPOROMANDIBULAR JOINT SYNDROME): Primary | ICD-10-CM

## 2024-04-11 DIAGNOSIS — B36.0 TINEA VERSICOLOR: ICD-10-CM

## 2024-04-11 PROCEDURE — 99213 OFFICE O/P EST LOW 20 MIN: CPT | Performed by: STUDENT IN AN ORGANIZED HEALTH CARE EDUCATION/TRAINING PROGRAM

## 2024-04-11 NOTE — PROGRESS NOTES
"  Assessment & Plan     TMJ (temporomandibular joint syndrome)  Exercises provided for patient. Recommended use of heat and ibuprofen    Tinea versicolor  Recommended selsen blue for treatment.    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 Patient decliens.    No follow-ups on file.    Sindhu Taylor is a 39 year old, presenting for the following health issues:  Jaw Pain (Painful to open jaw, painful on left side.  Started 2 days ago. )      4/11/2024    10:38 AM   Additional Questions   Roomed by Genoveva         4/11/2024    Information    services provided? Yes   Language Kira   Type of interpretation provided Face-to-face    name [facility]    Agency [employer]     Has left sided jaw pain. Pain started two days ago. Has locking and clicking and popping. Hurts sometimes when he eats, but not that much. Most of the pain is when he opens his mouth.    I also noted while examining him that he has skin changes that are scaly.        Objective    /75   Pulse 70   Temp 98.2  F (36.8  C)   Resp 14   Ht 1.626 m (5' 4\")   Wt 69.1 kg (152 lb 6.4 oz)   SpO2 97%   BMI 26.16 kg/m    Body mass index is 26.16 kg/m .  Physical Exam  Constitutional:       General: He is not in acute distress.  HENT:      Head: Normocephalic and atraumatic.      Mouth/Throat:      Mouth: Mucous membranes are moist.      Pharynx: No oropharyngeal exudate.      Comments: Tenderness to palpation of the internal and external left TMJ. Masseter spasm also noted of the left masseter.  Skin:     Comments: Nummular scaling patches noted of the shoulders/chest with mild hyperpigmentation   Neurological:      Mental Status: He is alert.              Signed Electronically by: Mai Lynn MD    "

## 2024-10-29 ENCOUNTER — OFFICE VISIT (OUTPATIENT)
Dept: FAMILY MEDICINE | Facility: CLINIC | Age: 39
End: 2024-10-29
Payer: COMMERCIAL

## 2024-10-29 VITALS
DIASTOLIC BLOOD PRESSURE: 71 MMHG | RESPIRATION RATE: 16 BRPM | SYSTOLIC BLOOD PRESSURE: 106 MMHG | TEMPERATURE: 98.2 F | OXYGEN SATURATION: 98 % | HEART RATE: 73 BPM | WEIGHT: 156.8 LBS | BODY MASS INDEX: 26.91 KG/M2

## 2024-10-29 DIAGNOSIS — L40.9 PSORIASIS: Primary | ICD-10-CM

## 2024-10-29 DIAGNOSIS — L21.9 SEBORRHEIC DERMATITIS: ICD-10-CM

## 2024-10-29 DIAGNOSIS — B36.0 TINEA VERSICOLOR: ICD-10-CM

## 2024-10-29 PROCEDURE — 99214 OFFICE O/P EST MOD 30 MIN: CPT | Mod: GC | Performed by: INTERNAL MEDICINE

## 2024-10-29 RX ORDER — KETOCONAZOLE 20 MG/ML
SHAMPOO, SUSPENSION TOPICAL DAILY
Qty: 120 ML | Refills: 4 | Status: SHIPPED | OUTPATIENT
Start: 2024-10-29

## 2024-10-29 RX ORDER — ITRACONAZOLE 100 MG/1
200 CAPSULE ORAL DAILY
Qty: 10 CAPSULE | Refills: 0 | Status: SHIPPED | OUTPATIENT
Start: 2024-10-29 | End: 2024-11-03

## 2024-10-29 RX ORDER — CETIRIZINE HYDROCHLORIDE 5 MG/1
5 TABLET ORAL DAILY PRN
Qty: 30 TABLET | Refills: 0 | Status: SHIPPED | OUTPATIENT
Start: 2024-10-29 | End: 2024-11-28

## 2024-10-29 RX ORDER — BETAMETHASONE DIPROPIONATE 0.05 %
OINTMENT (GRAM) TOPICAL 2 TIMES DAILY
Qty: 120 G | Refills: 0 | Status: SHIPPED | OUTPATIENT
Start: 2024-10-29

## 2024-10-29 ASSESSMENT — ENCOUNTER SYMPTOMS
DIZZINESS: 0
SHORTNESS OF BREATH: 0
FEVER: 0
CHILLS: 0
WHEEZING: 0

## 2024-10-29 NOTE — PROGRESS NOTES
"  Assessment & Plan     This is a 39-year-old male who presents with multiple chronic dermatology conditions listed below.    Psoriasis of the ankle   Is possible of the ankle rash could be due to psoriasis versus neurodermatitis.  We will try topical therapy again.  Patient wants to wait on a dermatology referral until follow-up.  If the rash is not improved we will refer to dermatology at next follow-up.  Differential diagnosis also includes lichen simplex chronicus.     - betamethasone dipropionate (DIPROSONE) 0.05 % external ointment; Apply topically 2 times daily.      Tinea versicolor of the neck and axilla   Seborrheic dermatitis   The geographic hyperpigmented rash is consistent with tenia versicolor and the scalp had a few bumps consistent with folliculitis with significant dandruff suggestive of seborrheic dermatitis.  Ketoconazole shampoo should treat both of these conditions.  Patient did try Selsun Blue in the past without success so we will avoid this.  Will also start patient on itraconazole 200 mg daily for 5 days.  The topical and oral combination should treat the tinea versicolor.    - ketoconazole (NIZORAL) 2 % external shampoo; Apply topically daily. Apply to scalp and body for 5 minutes) daily for 4 weeks  -Itraconazole 200 mg daily for 5 days  - cetirizine (ZYRTEC) 5 MG tablet; Take 1 tablet (5 mg) by mouth daily as needed (as  needed for pruritis).      BMI  Estimated body mass index is 26.91 kg/m  as calculated from the following:    Height as of 4/11/24: 1.626 m (5' 4\").    Weight as of this encounter: 71.1 kg (156 lb 12.8 oz).           Return in about 4 weeks (around 11/26/2024).    Sindhu Taylor is a 39 year old, presenting for the following health issues:  Derm Problem (Itchy spots on legs around ankle area.  Had for quite some time. Last medication was not helpful.) and Light/dark Spots (Around upper chest and neck area)        10/29/2024     4:23 PM   Additional Questions   Roomed " eliel Tomas         10/29/2024    Information    services provided? Yes   Language Kira   Type of interpretation provided Face-to-face    name Lizzy Ball    Agency [employer]      HPI   This a  39 year old male who presents with a rash. Patient was seen in 12/12/23 for a rash that was thought to be neurodermatitis due to contact dermatitis (dorsal aspects of the ankles) for which he received betamethasone ointment.  Patient presents today with similar complaints.  No rashes around the ankle area.  Patient complains of pruritus.  He did not have significant improvement with the betamethasone cream prescribed months ago.  He also complains of pruritus of the scalp and hyperpigmented rash of the neck and axilla.  He has tried Selsun Blue without satisfactory results for these conditions.  Patient is currently taking no medications and denies any medical problems.  Patient denies any major surgeries.He currently smokes a half a pack per day.      Review of Systems   Constitutional:  Negative for chills and fever.   Respiratory:  Negative for shortness of breath and wheezing.    Cardiovascular:  Negative for leg swelling.   Skin:  Positive for itching and rash.   Neurological:  Negative for dizziness.          Objective    /71   Pulse 73   Temp 98.2  F (36.8  C)   Resp 16   Wt 71.1 kg (156 lb 12.8 oz)   SpO2 98%   BMI 26.91 kg/m    Body mass index is 26.91 kg/m .  Physical Exam  Constitutional:       Appearance: He is not ill-appearing.   HENT:      Head: Atraumatic.      Comments: dandruff would if you have folliculitis noticed in the scalp.     Nose: Nose normal.   Eyes:      General: No scleral icterus.  Neck:      Comments: Geographical rash with hyperpigmentation noticed in his neck area and axilla.  Cardiovascular:      Rate and Rhythm: Normal rate.      Heart sounds: No murmur heard.  Pulmonary:      Breath sounds: No rales.   Abdominal:      General: Bowel sounds  are normal.   Musculoskeletal:         General: No swelling.   Feet:      Comments: Excoriation with ulcers noted in the ankle area bilaterally with possible plaques.   Neurological:      Mental Status: He is alert.             Signed Electronically by: Kassie Winchester MD

## 2024-11-08 ENCOUNTER — TELEPHONE (OUTPATIENT)
Dept: FAMILY MEDICINE | Facility: CLINIC | Age: 39
End: 2024-11-08
Payer: COMMERCIAL

## 2024-11-08 NOTE — TELEPHONE ENCOUNTER
Pt traveling to [facility] on Nov. 30. Scheduled with Dr. Guadalupe on 11/26.

## 2024-11-26 ENCOUNTER — OFFICE VISIT (OUTPATIENT)
Dept: FAMILY MEDICINE | Facility: CLINIC | Age: 39
End: 2024-11-26
Payer: COMMERCIAL

## 2024-11-26 VITALS
SYSTOLIC BLOOD PRESSURE: 128 MMHG | HEART RATE: 100 BPM | HEIGHT: 64 IN | OXYGEN SATURATION: 99 % | RESPIRATION RATE: 20 BRPM | DIASTOLIC BLOOD PRESSURE: 83 MMHG | BODY MASS INDEX: 26.53 KG/M2 | TEMPERATURE: 97.9 F | WEIGHT: 155.4 LBS

## 2024-11-26 DIAGNOSIS — Z71.84 TRAVEL ADVICE ENCOUNTER: Primary | ICD-10-CM

## 2024-11-26 RX ORDER — AZITHROMYCIN 500 MG/1
1000 TABLET, FILM COATED ORAL ONCE
Qty: 2 TABLET | Refills: 0 | Status: SHIPPED | OUTPATIENT
Start: 2024-11-26 | End: 2024-11-26

## 2024-11-26 RX ORDER — ATOVAQUONE AND PROGUANIL HYDROCHLORIDE 250; 100 MG/1; MG/1
TABLET, FILM COATED ORAL
Qty: 40 TABLET | Refills: 0 | Status: SHIPPED | OUTPATIENT
Start: 2024-11-26

## 2024-11-26 RX ORDER — LOPERAMIDE HYDROCHLORIDE 2 MG/1
TABLET ORAL
Qty: 30 TABLET | Refills: 0 | Status: SHIPPED | OUTPATIENT
Start: 2024-11-26

## 2024-11-26 NOTE — PROGRESS NOTES
[facility] Travel Visit       Claudia Henson is a 39 year old male who presents for a pre-travel assessment visit.  He will be traveling to [facility] from 11/30/24 - 12/30/24 to see family. Patient considering getting a tattoo while in [place] as it is cheaper there. Asked whether tattoo artists would use new needles patient stated they do for every customer.    Destination(s):  [facility]: [facility], [facility], and a refugee camp in [facility]. Patient's siblings still reside in [place].    Reason for travel: Visit family  Special Activity Consideration: None    Claudia Henson has completed the travel questionnaire and it is reviewed: YES  Are there special circumstances which place this traveler at higher risk (List if 'yes')?: NO     Social history:   EtOH: not currently  Tobacco: Cigarettes - 3/day  Illicits: none    Travelling alone.    Allergies   Allergen Reactions    Beef-Derived Products      Rash     Shrimp      rash       Patient Active Problem List   Diagnosis    Insomnia, unspecified type    Cigarette nicotine dependence without complication    Dermatitis    Lower urinary tract symptoms (LUTS)        Current Outpatient Medications   Medication Sig Dispense Refill    augmented betamethasone dipropionate (DIPROLENE AF) 0.05 % external cream Apply topically 2 times daily (Patient not taking: Reported on 2/22/2024) 50 g 0    betamethasone dipropionate (DIPROSONE) 0.05 % external ointment Apply topically 2 times daily. (Patient not taking: Reported on 11/26/2024) 120 g 0    betamethasone dipropionate (DIPROSONE) 0.05 % external ointment Apply topically 2 times daily (Patient not taking: Reported on 2/22/2024) 60 g 1    cetirizine (ZYRTEC) 5 MG tablet Take 1 tablet (5 mg) by mouth daily as needed (as  needed for pruritis). (Patient not taking: Reported on 11/26/2024) 30 tablet 0    ketoconazole (NIZORAL) 2 % external shampoo Apply topically daily. Apply to scalp and body for 5 minutes) daily for  "4 weeks (Patient not taking: Reported on 11/26/2024) 120 mL 4    mineral oil-hydrophilic petrolatum (AQUAPHOR) external ointment Apply topically as needed (Patient not taking: Reported on 2/22/2024) 420 g 11    nicotine (COMMIT) 2 MG lozenge Place 1 lozenge (2 mg) inside cheek every hour as needed for nicotine withdrawal symptoms (Patient not taking: Reported on 4/11/2024) 168 lozenge 3    oxyBUTYnin ER (DITROPAN XL) 10 MG 24 hr tablet Take 1 tablet (10 mg) by mouth daily (Patient not taking: Reported on 2/22/2024) 30 tablet 11     No current facility-administered medications for this visit.        Most Recent Immunizations   Administered Date(s) Administered    COVID-19 Monovalent 18+ (Moderna) 01/25/2022    Influenza Vaccine >6 months,quad, PF 12/28/2021    Influenza Vaccine, 6+MO IM (QUADRIVALENT W/PRESERVATIVES) 10/11/2019    Pneumococcal 23 valent 12/28/2021        Immunizations, travel specific:  -- Yellow fever: Not required  -- Hep A:  UTD  -- Typhoid (IM: booster every 2 years; PO: booster every 5 years): Recommended  -- Hep B: Not required  -- Rabies: (Recommended for high risk travelers with limited access to medical care) Not required  -- Greek encephalitis: (Recommend for high risk travelers to agriculture, farming areas, high season June-October) Not required  -- Meningococcal meningitis:  UTD             Review of Systems:     Patient denies any recent illness, fever, chills, chest pain, shortness of breath, nausea, vomiting, diarrhea, abdominal pain.         Objective:     /83   Pulse 100   Temp 97.9  F (36.6  C) (Tympanic)   Resp 20   Ht 1.626 m (5' 4.02\")   Wt 70.5 kg (155 lb 6.4 oz)   SpO2 99%   BMI 26.66 kg/m    Body mass index is 26.66 kg/m .    Constitutional: awake, alert, cooperative, no apparent distress  Eyes: Lids and lashes normal, pupils equal, sclera clear, conjunctiva normal  ENT: Normocephalic, without obvious abnormality, atraumatic, external ears without lesions, " oral pharynx with moist mucous membranes  Respiratory: No increased work of breathing, speaking in full sentences  Cardiovascular: Appears well perfused  Skin: no bruising or bleeding and normal skin color, texture, turgor on exposed skin  Psych: Appropriate mood and affect         Assessment and Plan       Chronic Medical Conditions  (Please document chronic medical conditions or acute concerns discussed/managed during the visit if applicable)  There are no diagnoses linked to this encounter.     All chronic medications were refilled as needed with a 90 day supply.    Pretravel Plan  Based on patient's past history, review of immunization and immunity status, planned itinerary and current recommendations, the following are recommended:  Influenza, Typhoid, TDaP, and COVID. Only wanted Typhoid today, due for TDAP.  Patient was strongly recommended to receive Tdap vaccine today due to interest in getting tattoo while traveling, however patient declined.  mosquito avoidance     Patient is given a copy of the howsimple traveller report as well as destination-specific recommendations on sun protection, avoiding insect bites and travel safety.      Total of 30 minutes was spent in face to face contact with patient with > 50% in counseling and coordination of care.  Total encounter including preparation, review of travel guidelines, placing orders and completion of documentation: 32 minutes.  Options for treatment and/or follow-up care were reviewed with the patient. Claudia Henson was engaged and actively involved in the decision making process. He verbalized understanding of the options discussed and was satisfied with the final plan.      DO Magnus Zarate,  PGY-2  [facility]  11/26/24    Precepted today with Dr. Gaston Cardona.